# Patient Record
Sex: MALE | Race: BLACK OR AFRICAN AMERICAN | NOT HISPANIC OR LATINO | Employment: FULL TIME | URBAN - NONMETROPOLITAN AREA
[De-identification: names, ages, dates, MRNs, and addresses within clinical notes are randomized per-mention and may not be internally consistent; named-entity substitution may affect disease eponyms.]

---

## 2017-01-16 RX ORDER — NIFEDIPINE 60 MG/1
TABLET, FILM COATED, EXTENDED RELEASE ORAL
Qty: 90 TABLET | Refills: 0 | Status: SHIPPED | OUTPATIENT
Start: 2017-01-16 | End: 2017-01-26

## 2017-01-26 ENCOUNTER — OFFICE VISIT (OUTPATIENT)
Dept: FAMILY MEDICINE CLINIC | Facility: CLINIC | Age: 57
End: 2017-01-26

## 2017-01-26 VITALS
TEMPERATURE: 97.6 F | WEIGHT: 315 LBS | SYSTOLIC BLOOD PRESSURE: 148 MMHG | HEART RATE: 85 BPM | BODY MASS INDEX: 44.1 KG/M2 | OXYGEN SATURATION: 94 % | DIASTOLIC BLOOD PRESSURE: 70 MMHG | HEIGHT: 71 IN

## 2017-01-26 DIAGNOSIS — I10 ESSENTIAL HYPERTENSION: ICD-10-CM

## 2017-01-26 DIAGNOSIS — Z00.00 HEALTHCARE MAINTENANCE: ICD-10-CM

## 2017-01-26 DIAGNOSIS — M19.90 ARTHRITIS: Primary | ICD-10-CM

## 2017-01-26 DIAGNOSIS — E78.5 DYSLIPIDEMIA: ICD-10-CM

## 2017-01-26 DIAGNOSIS — E11.40 TYPE 2 DIABETES MELLITUS WITH DIABETIC NEUROPATHY, WITHOUT LONG-TERM CURRENT USE OF INSULIN (HCC): ICD-10-CM

## 2017-01-26 PROBLEM — M15.9 OSTEOARTHRITIS OF MULTIPLE JOINTS: Status: ACTIVE | Noted: 2017-01-26

## 2017-01-26 LAB
EXPIRATION DATE: ABNORMAL
HBA1C MFR BLD: 7.9 %
Lab: 671
POC CREATININE URINE: NORMAL
POC MICROALBUMIN URINE: NORMAL

## 2017-01-26 PROCEDURE — 83036 HEMOGLOBIN GLYCOSYLATED A1C: CPT | Performed by: NURSE PRACTITIONER

## 2017-01-26 PROCEDURE — 82044 UR ALBUMIN SEMIQUANTITATIVE: CPT | Performed by: NURSE PRACTITIONER

## 2017-01-26 PROCEDURE — 99214 OFFICE O/P EST MOD 30 MIN: CPT | Performed by: NURSE PRACTITIONER

## 2017-01-26 RX ORDER — LOSARTAN POTASSIUM 100 MG/1
100 TABLET ORAL DAILY
Qty: 90 TABLET | Refills: 3 | Status: SHIPPED | OUTPATIENT
Start: 2017-01-26 | End: 2020-09-23

## 2017-01-26 RX ORDER — HYDROCHLOROTHIAZIDE 12.5 MG/1
12.5 CAPSULE, GELATIN COATED ORAL DAILY
Qty: 90 CAPSULE | Refills: 3 | Status: SHIPPED | OUTPATIENT
Start: 2017-01-26 | End: 2020-09-23

## 2017-01-26 RX ORDER — HYDROCODONE BITARTRATE AND ACETAMINOPHEN 5; 325 MG/1; MG/1
1 TABLET ORAL 2 TIMES DAILY
Qty: 60 TABLET | Refills: 0 | Status: SHIPPED | OUTPATIENT
Start: 2017-01-26 | End: 2017-02-25

## 2017-01-26 RX ORDER — PNV NO.95/FERROUS FUM/FOLIC AC 28MG-0.8MG
1 TABLET ORAL DAILY
Qty: 90 EACH | Refills: 3 | Status: SHIPPED | OUTPATIENT
Start: 2017-01-26 | End: 2017-04-26

## 2017-01-26 RX ORDER — PYRITHIONE ZINC 1 G/ML
1 SHAMPOO TOPICAL DAILY
Qty: 90 EACH | Refills: 3 | Status: SHIPPED | OUTPATIENT
Start: 2017-01-26

## 2017-01-26 RX ORDER — NIFEDIPINE 60 MG/1
60 TABLET, EXTENDED RELEASE ORAL DAILY
Qty: 90 TABLET | Refills: 3 | Status: SHIPPED | OUTPATIENT
Start: 2017-01-26 | End: 2018-01-22 | Stop reason: SDUPTHER

## 2017-01-26 RX ORDER — DOCUSATE SODIUM 100 MG/1
100 CAPSULE, LIQUID FILLED ORAL DAILY
Qty: 90 CAPSULE | Refills: 3 | Status: SHIPPED | OUTPATIENT
Start: 2017-01-26 | End: 2017-06-27

## 2017-01-26 RX ORDER — LOSARTAN POTASSIUM 100 MG/1
TABLET ORAL
COMMUNITY
Start: 2016-11-23 | End: 2017-01-26 | Stop reason: SDUPTHER

## 2017-01-26 NOTE — MR AVS SNAPSHOT
Pastor Jeter   1/26/2017 9:30 AM   Office Visit    Dept Phone:  [phone]   Encounter #:  [id number]    Provider:  OVIDIO Puckett   Department:  [facility]                Your Full Care Plan              Today's Medication Changes          These changes are accurate as of: 1/26/17 10:24 AM.  If you have any questions, ask your nurse or doctor.               New Medication(s)Ordered:     HYDROcodone-acetaminophen 5-325 MG per tablet   Commonly known as:  NORCO   Take 1 tablet by mouth 2 (Two) Times a Day for 30 days.   Started by:  OVIDIO Puckett         Medication(s)that have changed:     Canagliflozin 300 MG tablet   Commonly known as:  INVOKANA   Take 300 mg by mouth Daily for 90 days.   What changed:    - medication strength  - how much to take   Changed by:  OVIDIO Puckett       docusate sodium 100 MG capsule   Commonly known as:  COLACE   Take 1 capsule by mouth Daily.   What changed:    - how much to take  - when to take this   Changed by:  OVIDIO Puckett       EQL FISH OIL 1000 MG capsule   Take 1 tablet by mouth Daily.   What changed:  how much to take   Changed by:  OVIDIO Puckett       Iron 325 (65 FE) MG tablet   Take 1 tablet by mouth Daily for 90 days.   What changed:  how much to take   Changed by:  OVIDIO Puckett       losartan 100 MG tablet   Commonly known as:  COZAAR   Take 1 tablet by mouth Daily for 90 days.   What changed:    - how much to take  - how to take this  - when to take this   Changed by:  OVIDIO Puckett       NIFEdipine XL 60 MG 24 hr tablet   Commonly known as:  NIFEDICAL XL   Take 1 tablet by mouth Daily.   What changed:  See the new instructions.   Changed by:  OVIDIO Puckett         Stop taking medication(s)listed here:     Diclofenac 35 MG capsule      Stopped by:  OVIDIO Puckett           glucosamine-chondroitin 500-400 MG capsule capsule   Stopped by:  OVIDIO Puckett           losartan-hydrochlorothiazide 100-25 MG per tablet   Commonly known as:  HYZAAR   Stopped by:  OVIDIO Puckett                Where to Get Your Medications      These medications were sent to [facility] - [place], [place] - [address] [facility] - [phone]  - [phone] [address], [facility] [address]     Phone:  [phone]     Canagliflozin 300 MG tablet    docusate sodium 100 MG capsule    EQL FISH OIL 1000 MG capsule    hydrochlorothiazide 12.5 MG capsule    Iron 325 (65 FE) MG tablet    losartan 100 MG tablet    metFORMIN 1000 MG tablet    NIFEdipine XL 60 MG 24 hr tablet         You can get these medications from any pharmacy     Bring a paper prescription for each of these medications     HYDROcodone-acetaminophen 5-325 MG per tablet                  Your Updated Medication List          This list is accurate as of: 1/26/17 10:24 AM.  Always use your most recent med list.                Canagliflozin 300 MG tablet   Commonly known as:  INVOKANA   Take 300 mg by mouth Daily for 90 days.       docusate sodium 100 MG capsule   Commonly known as:  COLACE   Take 1 capsule by mouth Daily.       EQL FISH OIL 1000 MG capsule   Take 1 tablet by mouth Daily.       hydrochlorothiazide 12.5 MG capsule   Commonly known as:  MICROZIDE   Take 1 capsule by mouth Daily for 90 days.       HYDROcodone-acetaminophen 5-325 MG per tablet   Commonly known as:  NORCO   Take 1 tablet by mouth 2 (Two) Times a Day for 30 days.       Iron 325 (65 FE) MG tablet   Take 1 tablet by mouth Daily for 90 days.       losartan 100 MG tablet   Commonly known as:  COZAAR   Take 1 tablet by mouth Daily for 90 days.       meloxicam 15 MG tablet   Commonly known as:  MOBIC       metFORMIN 1000 MG tablet   Commonly known  as:  GLUCOPHAGE   Take 1 tablet by mouth 2 (Two) Times a Day With Meals for 90 days.       Multiple Vitamin Essential tablet       NIFEdipine XL 60 MG 24 hr tablet   Commonly known as:  NIFEDICAL XL   Take 1 tablet by mouth Daily.       OSTEO BI-FLEX JOINT SHIELD PO       vitamin D 98884 UNITS capsule capsule   Commonly known as:  ERGOCALCIFEROL   Take 1 capsule by mouth Every 7 (Seven) Days.               We Performed the Following     Ambulatory Referral to Orthopedic Surgery     CBC & Differential     Compliance Drug Analysis, Ur     Comprehensive Metabolic Panel     Hepatitis C Antibody     Lipid Panel     POC Glycated Hemoglobin, Total     POC Microalbumin       You Were Diagnosed With        Codes Comments    Arthritis    -  Primary ICD-10-CM: M19.90  ICD-9-CM: 716.90     Type 2 diabetes mellitus with diabetic neuropathy, without long-term current use of insulin     ICD-10-CM: E11.40  ICD-9-CM: 250.60, 357.2     Essential hypertension     ICD-10-CM: I10  ICD-9-CM: 401.9     Dyslipidemia     ICD-10-CM: E78.5  ICD-9-CM: 272.4     Healthcare maintenance     ICD-10-CM: Z00.00  ICD-9-CM: V70.0       Instructions     None    Patient Instructions History      Upcoming Appointments     Visit Type Date Time Department    ACUTE           2017  9:30 AM BOOGIE QUINTEROS      [employer] Signup     [facility] allows you to send messages to your doctor, view your test results, renew your prescriptions, schedule appointments, and more. To sign up, go to [employer] and click on the Sign Up Now link in the New User? box. Enter your [employer] Activation Code exactly as it appears below along with the last four digits of your Social Security Number and your Date of Birth () to complete the sign-up process. If you do not sign up before the expiration date, you must request a new code.    Spot[employer] Activation Code: [email or web address]  Expires: 2017 10:24 AM    If you have questions, you can email  [email or web address] or call [phone] to talk to our MyChart staff. Remember, MyChart is NOT to be used for urgent needs. For medical emergencies, dial 911.               Other Info from Your Visit           Allergies     Jardiance [Empagliflozin]  GI Intolerance      Reason for Visit     Knee Pain Left Knee      Vital Signs     Blood Pressure Pulse Temperature Height Weight Oxygen Saturation    148/70 (BP Location: Left arm) 85 97.6 °F (36.4 °C) (Oral) 71\" (180.3 cm) 320 lb (145 kg) 94%    Body Mass Index Smoking Status                44.63 kg/m2 Former Smoker          Problems and Diagnoses Noted     Arthritis    High blood pressure    Diabetes    Dyslipidemia    Osteoarthritis (arthritis due to wear and tear of joints)    Routine medical exam            "

## 2017-01-26 NOTE — PROGRESS NOTES
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 Patient is here today for left knee pain. He is not sure what he did to it. His knee has been hurting 2-3 weeks and 4 days ago it got worse and now cannot walk on it.     History of Present Illness 56-year-old -American male presents to the office today with severe knee pain especially worse in his left knee pain is grinding stabbing unbearable. His knee has been hurting exceptionally bad for 2-3 weeks and 4 days ago got to the point where he could no longer walk. Using cane in right hand only able to ambulate a few feet at a time. Has not been able to work for the last week. Patient has been fighting arthritis for the last several years. He is rubbing bone-on-bone and needs a knee replacement. Will refer to orthopedic surgery to see if Synvisc injections might prolong a surgical date. Patient also due for routine lab work including A1c and microalbumin. Patient cut his metformin dose in half because he felt the metformin made him feel tired. Stressed the importance of taking the whole dose of metformin to keep A1c down.     The following portions of the patient's history were reviewed and updated as appropriate: allergies, current medications, past family history, past medical history, past social history, past surgical history and problem list.    Review of Systems   Musculoskeletal: Positive for gait problem and joint swelling.        Knee Pain.   All other systems reviewed and are negative.      Objective   Physical Exam   Constitutional: He is oriented to person, place, and time. He appears well-developed and well-nourished.   HENT:   Head: Normocephalic and atraumatic.   Eyes: EOM are normal. Pupils are equal, round, and reactive to light.   Neck: Normal range of motion. Neck supple.   Cardiovascular: Normal rate and regular rhythm.    Pulmonary/Chest: Effort normal and breath sounds normal.   Abdominal: Soft. Bowel sounds are normal.   obese Abd    Musculoskeletal: He exhibits edema, tenderness and deformity.   Arthritic changes throughout body but worse and knees especially the left     Neurological: He is alert and oriented to person, place, and time.   Skin: Skin is warm and dry.   Psychiatric: He has a normal mood and affect. His behavior is normal. Judgment and thought content normal.   Nursing note and vitals reviewed.      Assessment/Plan   Pastor was seen today for knee pain.    Diagnoses and all orders for this visit:    Arthritis  -     Ambulatory Referral to Orthopedic Surgery  -     CBC & Differential  -     Comprehensive Metabolic Panel  -     Lipid Panel  -     POC Glycated Hemoglobin, Total  -     POC Microalbumin  -     Compliance Drug Analysis, Ur; Future  -     HYDROcodone-acetaminophen (NORCO) 5-325 MG per tablet; Take 1 tablet by mouth 2 (Two) Times a Day for 30 days.    Type 2 diabetes mellitus with diabetic neuropathy, without long-term current use of insulin  -     Ambulatory Referral to Orthopedic Surgery  -     CBC & Differential  -     Comprehensive Metabolic Panel  -     Lipid Panel  -     POC Glycated Hemoglobin, Total  -     POC Microalbumin    Essential hypertension    Dyslipidemia    Other orders  -     NIFEdipine XL (NIFEDICAL XL) 60 MG 24 hr tablet; Take 1 tablet by mouth Daily.  -     hydrochlorothiazide (MICROZIDE) 12.5 MG capsule; Take 1 capsule by mouth Daily for 90 days.  -     Canagliflozin (INVOKANA) 300 MG tablet; Take 300 mg by mouth Daily for 90 days.  -     docusate sodium (COLACE) 100 MG capsule; Take 1 capsule by mouth Daily.  -     Ferrous Sulfate (IRON) 325 (65 FE) MG tablet; Take 1 tablet by mouth Daily for 90 days.  -     losartan (COZAAR) 100 MG tablet; Take 1 tablet by mouth Daily for 90 days.  -     metFORMIN (GLUCOPHAGE) 1000 MG tablet; Take 1 tablet by mouth 2 (Two) Times a Day With Meals for 90 days.  -     Omega-3 Fatty Acids (EQL FISH OIL) 1000 MG capsule; Take 1 tablet by mouth Daily.    To F/U every  6 months and PRN. To eat a low-fat high-fiber diet full of fruits and vegetables, drinking 6-8 glasses of water a day, and exercising 30 minutes a day as able. To follow-up with orthopedic surgery for treatment options. Patient does not want surgery unless absolutely necessary. Notify office immediately of any decline in health status. Patient has been taking 1 metformin tablet a day because to metformin tablets a day make him fatigued. Asked to check his blood sugar periodically record and report on next visit. Compliant screen requested, Kaspar requested, and medication agreement signed. In office A1c is 7.9 and in office microalbumin is normal. Patient taking only half his dose of metformin as reason for elevated A1c. Stressed the importance to the patient to take proper dose of metformin

## 2017-01-27 LAB
ALBUMIN SERPL-MCNC: 4.5 G/DL (ref 3.5–5.2)
ALBUMIN/GLOB SERPL: 1.3 G/DL
ALP SERPL-CCNC: 97 U/L (ref 39–117)
ALT SERPL-CCNC: 41 U/L (ref 1–41)
AST SERPL-CCNC: 23 U/L (ref 1–40)
BASOPHILS # BLD AUTO: 0.03 10*3/MM3 (ref 0–0.2)
BASOPHILS NFR BLD AUTO: 0.5 % (ref 0–1.5)
BILIRUB SERPL-MCNC: 0.4 MG/DL (ref 0.1–1.2)
BUN SERPL-MCNC: 18 MG/DL (ref 6–20)
BUN/CREAT SERPL: 18.4 (ref 7–25)
CALCIUM SERPL-MCNC: 10.5 MG/DL (ref 8.6–10.5)
CHLORIDE SERPL-SCNC: 101 MMOL/L (ref 98–107)
CHOLEST SERPL-MCNC: 230 MG/DL (ref 0–200)
CO2 SERPL-SCNC: 25.7 MMOL/L (ref 22–29)
CREAT SERPL-MCNC: 0.98 MG/DL (ref 0.76–1.27)
EOSINOPHIL # BLD AUTO: 0.12 10*3/MM3 (ref 0–0.7)
EOSINOPHIL NFR BLD AUTO: 1.9 % (ref 0.3–6.2)
ERYTHROCYTE [DISTWIDTH] IN BLOOD BY AUTOMATED COUNT: 13.5 % (ref 11.5–14.5)
GLOBULIN SER CALC-MCNC: 3.4 GM/DL
GLUCOSE SERPL-MCNC: 162 MG/DL (ref 65–99)
HCT VFR BLD AUTO: 48 % (ref 40.4–52.2)
HCV AB S/CO SERPL IA: 0.1 S/CO RATIO (ref 0–0.9)
HDLC SERPL-MCNC: 60 MG/DL (ref 40–60)
HGB BLD-MCNC: 14.8 G/DL (ref 13.7–17.6)
IMM GRANULOCYTES # BLD: 0 10*3/MM3 (ref 0–0.03)
IMM GRANULOCYTES NFR BLD: 0 % (ref 0–0.5)
LDLC SERPL CALC-MCNC: 142 MG/DL (ref 0–100)
LYMPHOCYTES # BLD AUTO: 1.88 10*3/MM3 (ref 0.9–4.8)
LYMPHOCYTES NFR BLD AUTO: 29.9 % (ref 19.6–45.3)
MCH RBC QN AUTO: 31.4 PG (ref 27–32.7)
MCHC RBC AUTO-ENTMCNC: 30.8 G/DL (ref 32.6–36.4)
MCV RBC AUTO: 101.7 FL (ref 79.8–96.2)
MONOCYTES # BLD AUTO: 0.63 10*3/MM3 (ref 0.2–1.2)
MONOCYTES NFR BLD AUTO: 10 % (ref 5–12)
NEUTROPHILS # BLD AUTO: 3.63 10*3/MM3 (ref 1.9–8.1)
NEUTROPHILS NFR BLD AUTO: 57.7 % (ref 42.7–76)
PLATELET # BLD AUTO: 264 10*3/MM3 (ref 140–500)
POTASSIUM SERPL-SCNC: 4.6 MMOL/L (ref 3.5–5.2)
PROT SERPL-MCNC: 7.9 G/DL (ref 6–8.5)
RBC # BLD AUTO: 4.72 10*6/MM3 (ref 4.6–6)
SODIUM SERPL-SCNC: 145 MMOL/L (ref 136–145)
TRIGL SERPL-MCNC: 138 MG/DL (ref 0–150)
VLDLC SERPL CALC-MCNC: 27.6 MG/DL (ref 5–40)
WBC # BLD AUTO: 6.29 10*3/MM3 (ref 4.5–10.7)

## 2017-01-31 LAB
DRUGS UR: NORMAL
Lab: NORMAL

## 2017-02-06 ENCOUNTER — DOCUMENTATION (OUTPATIENT)
Dept: FAMILY MEDICINE CLINIC | Facility: CLINIC | Age: 57
End: 2017-02-06

## 2017-02-06 ENCOUNTER — TELEPHONE (OUTPATIENT)
Dept: FAMILY MEDICINE CLINIC | Facility: CLINIC | Age: 57
End: 2017-02-06

## 2017-02-06 NOTE — PROGRESS NOTES
Patient called to notify me that he has accidentally fallen on his knees causing a great deal of pain and that he is unable to walk at this point. We will be scheduling appointment as soon as he is able to drive himself here.

## 2017-02-06 NOTE — TELEPHONE ENCOUNTER
Patient called this morning asking for you. He said you all had talked the other day and it was in regards to that and he needs you to call him back. He would like for you to call him at [phone].

## 2017-02-20 ENCOUNTER — TELEPHONE (OUTPATIENT)
Dept: FAMILY MEDICINE CLINIC | Facility: CLINIC | Age: 57
End: 2017-02-20

## 2017-02-20 NOTE — TELEPHONE ENCOUNTER
Patient is being seen by orthopedic surgery. I wrote a brief letter explaining the fell at home hurt his knees and missed work. Thank you

## 2017-02-20 NOTE — TELEPHONE ENCOUNTER
----- Message from Misha Florence sent at 2/20/2017  8:09 AM EST -----  Regarding: REQUESTING CALL   Contact: [phone]  ANDRES PATIENT IS REQUESTING A CALL IN REGARDS TO INJURY TO HIS KNEE. PATIENT STATED HE HAS TALKED YOU IN REGARDS TO HIS [employer] INSURANCE NOT WANTING TO PAY. PLEASE CALL [phone]. I ASKED PATIENT IF HE NEEDED TO SCHEDULE AN APPOINTMENT, HE DECLINED.    THANKS!

## 2017-02-23 ENCOUNTER — OFFICE VISIT (OUTPATIENT)
Dept: ORTHOPEDIC SURGERY | Facility: CLINIC | Age: 57
End: 2017-02-23

## 2017-02-23 VITALS — BODY MASS INDEX: 42.66 KG/M2 | HEIGHT: 72 IN | WEIGHT: 315 LBS

## 2017-02-23 DIAGNOSIS — G89.29 CHRONIC PAIN OF BOTH KNEES: Primary | ICD-10-CM

## 2017-02-23 DIAGNOSIS — M25.562 CHRONIC PAIN OF BOTH KNEES: Primary | ICD-10-CM

## 2017-02-23 DIAGNOSIS — M19.90 ARTHRITIS: ICD-10-CM

## 2017-02-23 DIAGNOSIS — M25.561 CHRONIC PAIN OF BOTH KNEES: Primary | ICD-10-CM

## 2017-02-23 PROCEDURE — 99204 OFFICE O/P NEW MOD 45 MIN: CPT | Performed by: ORTHOPAEDIC SURGERY

## 2017-02-23 PROCEDURE — 73562 X-RAY EXAM OF KNEE 3: CPT | Performed by: ORTHOPAEDIC SURGERY

## 2017-02-23 PROCEDURE — 20610 DRAIN/INJ JOINT/BURSA W/O US: CPT | Performed by: ORTHOPAEDIC SURGERY

## 2017-02-23 RX ORDER — METHYLPREDNISOLONE ACETATE 80 MG/ML
80 INJECTION, SUSPENSION INTRA-ARTICULAR; INTRALESIONAL; INTRAMUSCULAR; SOFT TISSUE
Status: COMPLETED | OUTPATIENT
Start: 2017-02-23 | End: 2017-02-23

## 2017-02-23 RX ORDER — BUPIVACAINE HYDROCHLORIDE 5 MG/ML
4 INJECTION, SOLUTION EPIDURAL; INTRACAUDAL
Status: COMPLETED | OUTPATIENT
Start: 2017-02-23 | End: 2017-02-23

## 2017-02-23 RX ADMIN — METHYLPREDNISOLONE ACETATE 80 MG: 80 INJECTION, SUSPENSION INTRA-ARTICULAR; INTRALESIONAL; INTRAMUSCULAR; SOFT TISSUE at 08:56

## 2017-02-23 RX ADMIN — BUPIVACAINE HYDROCHLORIDE 4 ML: 5 INJECTION, SOLUTION EPIDURAL; INTRACAUDAL at 08:56

## 2017-02-23 NOTE — PROGRESS NOTES
New bilateral Knee      Patient: Pastor Jeter        YOB: 1960    Medical Record Number: [id number]        Chief Complaints: Bilateral Knee Pain       History of Present Illness: This is a  56 y.o.  who presents complaining of bilateral knee pain left greater than right.  He states his been ongoing for quite some time it worsened around January 24.  He has medial knee pain and posterior knee pain pain with activity it is moderate constant aching with clicking popping snapping and bruising.  His past medical history marked for obesity and diabetes.    Allergies:   Allergies   Allergen Reactions   • Jardiance [Empagliflozin] GI Intolerance       Medications:   Home Medications:  Current Outpatient Prescriptions on File Prior to Visit   Medication Sig   • Canagliflozin (INVOKANA) 300 MG tablet Take 300 mg by mouth Daily for 90 days.   • Ferrous Sulfate (IRON) 325 (65 FE) MG tablet Take 1 tablet by mouth Daily for 90 days.   • hydrochlorothiazide (MICROZIDE) 12.5 MG capsule Take 1 capsule by mouth Daily for 90 days.   • losartan (COZAAR) 100 MG tablet Take 1 tablet by mouth Daily for 90 days.   • meloxicam (MOBIC) 15 MG tablet Take 15 mg by mouth daily.   • metFORMIN (GLUCOPHAGE) 1000 MG tablet Take 1 tablet by mouth 2 (Two) Times a Day With Meals for 90 days.   • Misc Natural Products (OSTEO BI-FLEX JOINT SHIELD PO) Take  by mouth.   • Multiple Vitamin Essential tablet Take  by mouth daily.   • NIFEdipine XL (NIFEDICAL XL) 60 MG 24 hr tablet Take 1 tablet by mouth Daily.   • Omega-3 Fatty Acids (EQL FISH OIL) 1000 MG capsule Take 1 tablet by mouth Daily.   • vitamin D (ERGOCALCIFEROL) 53338 UNITS capsule capsule Take 1 capsule by mouth Every 7 (Seven) Days.   • docusate sodium (COLACE) 100 MG capsule Take 1 capsule by mouth Daily.   • HYDROcodone-acetaminophen (NORCO) 5-325 MG per tablet Take 1 tablet by mouth 2 (Two) Times a Day for 30 days.     No current facility-administered medications  "on file prior to visit.      Current Medications:  Scheduled Meds:  Continuous Infusions:  No current facility-administered medications for this visit.   PRN Meds:.    History reviewed. No pertinent past medical history.     Past Surgical History   Procedure Laterality Date   • Appendectomy          Social History     Occupational History   • Not on file.     Social History Main Topics   • Smoking status: Former Smoker   • Smokeless tobacco: Never Used   • Alcohol use Yes      Comment: occassional   • Drug use: No   • Sexual activity: Not on file    Social History     Social History Narrative        Family History   Problem Relation Age of Onset   • Diabetes Mother    • Hypertension Mother    • Hypertension Brother    • Hypertension Brother              Review of Systems: His 14 point review of systems are remarkable for the pertinent positives listed in chart by the patient the remainder are negative    Review of Systems      Physical Exam: 56 y.o. male  General Appearance:    Alert, cooperative, in no acute distress                 Vitals:    02/23/17 0817   Weight: (!) 358 lb (162 kg)   Height: 71.5\" (181.6 cm)      Patient is alert and read ×3 no acute distress appears her above-listed at height weight and age.  Affect is normal respiratory rate is normal unlabored. Heart rate regular rate rhythm, sclera, dentition and hearing are normal for the purpose of this exam.        Ortho Exam Physical exam of the left knee reveals no effusion, no erythema.  It mild loss of extension and full flexion  Patient has mild varus alignment.  They have mild tenderness to palpation about the medial compartment, no tenderness laterally..  The patient has a negative bounce home, negative Hailey and a stable ligamentous exam.  Quad tone is reasonable and symmetric.  There are no overlying skin changes no lymphedema no lymphadenopathy.  There is good hip range of motion which is full symmetric and asymptomatic and a normal ankle " exam.    Physical exam of the right knee reveals no effusion, no erythema.  It mild loss of extension and full flexion  Patient has mild varus alignment.  They have mild tenderness to palpation about the medial compartment, no tenderness laterally..  The patient has a negative bounce home, negative Hailey and a stable ligamentous exam.  Quad tone is reasonable and symmetric.  There are no overlying skin changes no lymphedema no lymphadenopathy.  There is good hip range of motion which is full symmetric and asymptomatic and a normal ankle exam.      Large Joint Arthrocentesis  Date/Time: 2/23/2017 8:56 AM  Consent given by: patient  Site marked: site marked  Timeout: Immediately prior to procedure a time out was called to verify the correct patient, procedure, equipment, support staff and site/side marked as required   Supporting Documentation  Indications: pain   Procedure Details  Location: knee - R knee  Needle size: 25 G  Approach: anteromedial  Medications administered: 80 mg methylPREDNISolone acetate 80 MG/ML; 4 mL bupivacaine (PF) 0.5 %      Large Joint Arthrocentesis  Date/Time: 2/23/2017 8:56 AM  Consent given by: patient  Site marked: site marked  Timeout: Immediately prior to procedure a time out was called to verify the correct patient, procedure, equipment, support staff and site/side marked as required   Supporting Documentation  Indications: pain   Procedure Details  Location: knee - L knee  Needle size: 25 G  Approach: anteromedial  Medications administered: 4 mL bupivacaine (PF) 0.5 %; 80 mg methylPREDNISolone acetate 80 MG/ML                     Radiology:   AP, Lateral and merchant views of the right and left I've no comparison films these reveal severe tricompartmental OA most pronounced on the medial aspect.  On the left he has a large ossicle anteriorly near the tibial tubercle.  He has sclerosis and bone spurs present as well.  knee  were ordered/reviewed to evauateknee pain.        Assessment/Plan:        Bilateral severe knee pain and arthritis I discussed with him the options available to him.  Think injections are quite reasonable he would like to try this.  I think it's imperative that he get his weight down.  I told him his weight is 2 hi this point to proceed with knee replacement he understands this and we'll get working on that.  I will have him see Dr. Wan for further discussion we will have him do some physical therapy and strengthening as well

## 2017-03-28 ENCOUNTER — CONSULT (OUTPATIENT)
Dept: ORTHOPEDIC SURGERY | Facility: CLINIC | Age: 57
End: 2017-03-28

## 2017-03-28 VITALS — WEIGHT: 315 LBS | HEIGHT: 72 IN | TEMPERATURE: 97.6 F | BODY MASS INDEX: 42.66 KG/M2

## 2017-03-28 DIAGNOSIS — M17.0 PRIMARY OSTEOARTHRITIS OF BOTH KNEES: Primary | ICD-10-CM

## 2017-03-28 PROCEDURE — 99204 OFFICE O/P NEW MOD 45 MIN: CPT | Performed by: ORTHOPAEDIC SURGERY

## 2017-03-28 NOTE — PROGRESS NOTES
Patient: Pastor Jeter  YOB: 1960 56 y.o. 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 Has done better over the last few weeks after cortisone injections. He is a diabetic, works as a .    Allergies:   Allergies   Allergen Reactions   • Jardiance [Empagliflozin] GI Intolerance       Medications:   Current Outpatient Prescriptions   Medication Sig Dispense Refill   • Canagliflozin (INVOKANA) 300 MG tablet Take 300 mg by mouth Daily for 90 days. 90 tablet 3   • docusate sodium (COLACE) 100 MG capsule Take 1 capsule by mouth Daily. 90 capsule 3   • Ferrous Sulfate (IRON) 325 (65 FE) MG tablet Take 1 tablet by mouth Daily for 90 days. 90 each 3   • hydrochlorothiazide (MICROZIDE) 12.5 MG capsule Take 1 capsule by mouth Daily for 90 days. 90 capsule 3   • losartan (COZAAR) 100 MG tablet Take 1 tablet by mouth Daily for 90 days. 90 tablet 3   • meloxicam (MOBIC) 15 MG tablet Take 15 mg by mouth daily.     • metFORMIN (GLUCOPHAGE) 1000 MG tablet Take 1 tablet by mouth 2 (Two) Times a Day With Meals for 90 days. 180 tablet 2   • Misc Natural Products (OSTEO BI-FLEX JOINT SHIELD PO) Take  by mouth.     • Multiple Vitamin Essential tablet Take  by mouth daily.     • NIFEdipine XL (NIFEDICAL XL) 60 MG 24 hr tablet Take 1 tablet by mouth Daily. 90 tablet 3   • Omega-3 Fatty Acids (EQL FISH OIL) 1000 MG capsule Take 1 tablet by mouth Daily. 90 each 3   • vitamin D (ERGOCALCIFEROL) 74444 UNITS capsule capsule Take 1 capsule by mouth Every 7 (Seven) Days. 20 capsule 1     No current facility-administered medications for this visit.          The following portions of the patient's history were reviewed and updated as  "appropriate: allergies, current medications, past family history, past medical history, past social history, past surgical history and problem list.    Review of Systems:   A 14 point review of systems was performed. All systems negative except pertinent positives/negative listed in HPI above    Physical Exam:   Vitals:    03/28/17 1308   Temp: 97.6 °F (36.4 °C)   Weight: (!) 350 lb (159 kg)   Height: 72\" (182.9 cm)       General: A and O x 3, ASA, NAD    SCLERA:    Normal    DENTITION:   Normal  Knee:  bilateral    ALIGNMENT:     Varus  ,   Patella  tracks  midline    GAIT:    Antalgic    SKIN:    No abnormality    RANGE OF MOTION:   5  -  120   DEG    STRENGTH:   4  / 5    LIGAMENTS:    No varus / valgus instability.   Negative  Lachman.    MENISCUS:     Negative   Hailey       DISTAL PULSES:    Paplable    DISTAL SENSATION :   Intact    LYMPHATICS:     No   lymphadenopathy    OTHER:          - Positive   effusion      - Crepitance with ROM          Radiology:  Xrays 3views both knees (ap,lateral, sunrise) recently taken at outside institution were reviewed demonstrating advanced varus osteoarthritis with bone on bone articulation, subchondral cysts, and periarticular osteophytes    Assessment/Plan: Bilateral knee advanced end-stage osteoarthritis.  He is clearly heading where he will need knee replacements.  I explained to him the advanced nature of his arthritis and the progressive nature meaning it will get worse over time.  He currently has an elevated BMI and needs to get to 300 pounds to have a BMI of 40 or below which would qualify him for surgery.  I have explained to him the urgency that he needs to have with regard to weight loss.  I've recommended that he get involved in a program such as Weight Watchers or [employer]ystem to assist him with this.  He may require further cortisone injections before the time of surgery.  I will plan on checking him back in 3 months and we will re-wait him at that time.  If " he needs a cortisone injection due to the increased pain in the interim he will call us.  Additionally he is going to work on therapy exercises and has a home exercise bike that he is going to wean into trying to get to 30 minutes 3 times a week.      Harpal Wan MD  3/28/2017

## 2017-05-23 ENCOUNTER — OFFICE VISIT (OUTPATIENT)
Dept: FAMILY MEDICINE CLINIC | Facility: CLINIC | Age: 57
End: 2017-05-23

## 2017-05-23 VITALS
OXYGEN SATURATION: 92 % | WEIGHT: 315 LBS | SYSTOLIC BLOOD PRESSURE: 122 MMHG | DIASTOLIC BLOOD PRESSURE: 68 MMHG | BODY MASS INDEX: 42.66 KG/M2 | HEART RATE: 81 BPM | HEIGHT: 72 IN | TEMPERATURE: 98.3 F

## 2017-05-23 DIAGNOSIS — E11.49 TYPE 2 DIABETES MELLITUS WITH OTHER NEUROLOGIC COMPLICATION, WITHOUT LONG-TERM CURRENT USE OF INSULIN (HCC): ICD-10-CM

## 2017-05-23 DIAGNOSIS — Z00.00 HEALTHCARE MAINTENANCE: ICD-10-CM

## 2017-05-23 DIAGNOSIS — Z23 NEED FOR PNEUMOCOCCAL VACCINE: ICD-10-CM

## 2017-05-23 DIAGNOSIS — M15.9 PRIMARY OSTEOARTHRITIS INVOLVING MULTIPLE JOINTS: Primary | ICD-10-CM

## 2017-05-23 PROCEDURE — 99214 OFFICE O/P EST MOD 30 MIN: CPT | Performed by: NURSE PRACTITIONER

## 2017-05-23 PROCEDURE — 90732 PPSV23 VACC 2 YRS+ SUBQ/IM: CPT | Performed by: NURSE PRACTITIONER

## 2017-05-23 PROCEDURE — 90471 IMMUNIZATION ADMIN: CPT | Performed by: NURSE PRACTITIONER

## 2017-05-23 RX ORDER — HYDROCHLOROTHIAZIDE 12.5 MG/1
12.5 CAPSULE, GELATIN COATED ORAL DAILY
COMMUNITY
End: 2018-01-27 | Stop reason: SDUPTHER

## 2017-05-23 RX ORDER — SIMVASTATIN 5 MG
5 TABLET ORAL NIGHTLY
Qty: 90 TABLET | Refills: 3 | Status: SHIPPED | OUTPATIENT
Start: 2017-05-23 | End: 2017-06-27

## 2017-05-23 RX ORDER — HYDROCODONE BITARTRATE AND ACETAMINOPHEN 5; 325 MG/1; MG/1
1 TABLET ORAL 2 TIMES DAILY PRN
Qty: 60 TABLET | Refills: 0 | Status: SHIPPED | OUTPATIENT
Start: 2017-05-23 | End: 2018-01-30 | Stop reason: SDUPTHER

## 2017-05-23 RX ORDER — LOSARTAN POTASSIUM 100 MG/1
100 TABLET ORAL DAILY
COMMUNITY
End: 2018-01-27 | Stop reason: SDUPTHER

## 2017-06-13 ENCOUNTER — TELEPHONE (OUTPATIENT)
Dept: FAMILY MEDICINE CLINIC | Facility: CLINIC | Age: 57
End: 2017-06-13

## 2017-06-13 RX ORDER — MELOXICAM 15 MG/1
15 TABLET ORAL DAILY
Qty: 90 TABLET | Refills: 3 | Status: SHIPPED | OUTPATIENT
Start: 2017-06-13 | End: 2017-06-15 | Stop reason: SDUPTHER

## 2017-06-13 NOTE — TELEPHONE ENCOUNTER
Please let patient know that I ordered his meloxicam for a 90 day supply sending it to [facility] as requested

## 2017-06-13 NOTE — TELEPHONE ENCOUNTER
Patient called states he has the information regarding medication for diabetes that you spoke about and wanted to speak to you only, please call [phone]

## 2017-06-14 NOTE — TELEPHONE ENCOUNTER
Patient states he called [facility] and they will not send it for 1 to 2 weeks because they told him it isn't time to be refilled yet, requesting a 30 days supply sent to [facility] and he will pay cash

## 2017-06-15 ENCOUNTER — TELEPHONE (OUTPATIENT)
Dept: FAMILY MEDICINE CLINIC | Facility: CLINIC | Age: 57
End: 2017-06-15

## 2017-06-15 RX ORDER — MELOXICAM 15 MG/1
15 TABLET ORAL DAILY
Qty: 30 TABLET | Refills: 3 | Status: SHIPPED | OUTPATIENT
Start: 2017-06-15 | End: 2018-01-30 | Stop reason: SDUPTHER

## 2017-06-15 RX ORDER — MELOXICAM 15 MG/1
15 TABLET ORAL DAILY
Qty: 30 TABLET | Refills: 3 | Status: SHIPPED | OUTPATIENT
Start: 2017-06-15 | End: 2017-06-15 | Stop reason: SDUPTHER

## 2017-06-15 NOTE — TELEPHONE ENCOUNTER
Pt came in and he is needing a 30 day suppy of his Meloxicam 15 mg called to [facility] he knows that [facility] called in a 90 day supply to his mail order but he is out because he was shorted at his last refill from the mailorder. His best call back is [phone]

## 2017-06-27 ENCOUNTER — RESULTS ENCOUNTER (OUTPATIENT)
Dept: FAMILY MEDICINE CLINIC | Facility: CLINIC | Age: 57
End: 2017-06-27

## 2017-06-27 ENCOUNTER — OFFICE VISIT (OUTPATIENT)
Dept: ORTHOPEDIC SURGERY | Facility: CLINIC | Age: 57
End: 2017-06-27

## 2017-06-27 VITALS — TEMPERATURE: 98.2 F | HEIGHT: 71 IN | WEIGHT: 315 LBS | BODY MASS INDEX: 44.1 KG/M2

## 2017-06-27 DIAGNOSIS — Z23 NEED FOR PNEUMOCOCCAL VACCINE: ICD-10-CM

## 2017-06-27 DIAGNOSIS — E11.49 TYPE 2 DIABETES MELLITUS WITH OTHER NEUROLOGIC COMPLICATION, WITHOUT LONG-TERM CURRENT USE OF INSULIN (HCC): ICD-10-CM

## 2017-06-27 DIAGNOSIS — M15.9 PRIMARY OSTEOARTHRITIS INVOLVING MULTIPLE JOINTS: ICD-10-CM

## 2017-06-27 DIAGNOSIS — Z00.00 HEALTHCARE MAINTENANCE: ICD-10-CM

## 2017-06-27 DIAGNOSIS — M17.0 PRIMARY OSTEOARTHRITIS OF BOTH KNEES: Primary | ICD-10-CM

## 2017-06-27 PROCEDURE — 20610 DRAIN/INJ JOINT/BURSA W/O US: CPT | Performed by: ORTHOPAEDIC SURGERY

## 2017-06-27 RX ORDER — METHYLPREDNISOLONE ACETATE 80 MG/ML
80 INJECTION, SUSPENSION INTRA-ARTICULAR; INTRALESIONAL; INTRAMUSCULAR; SOFT TISSUE
Status: COMPLETED | OUTPATIENT
Start: 2017-06-27 | End: 2017-06-27

## 2017-06-27 RX ORDER — LIDOCAINE HYDROCHLORIDE 10 MG/ML
2 INJECTION, SOLUTION INFILTRATION; PERINEURAL
Status: COMPLETED | OUTPATIENT
Start: 2017-06-27 | End: 2017-06-27

## 2017-06-27 RX ORDER — BUPIVACAINE HYDROCHLORIDE 5 MG/ML
2 INJECTION, SOLUTION PERINEURAL
Status: COMPLETED | OUTPATIENT
Start: 2017-06-27 | End: 2017-06-27

## 2017-06-27 RX ADMIN — LIDOCAINE HYDROCHLORIDE 2 ML: 10 INJECTION, SOLUTION INFILTRATION; PERINEURAL at 13:16

## 2017-06-27 RX ADMIN — BUPIVACAINE HYDROCHLORIDE 2 ML: 5 INJECTION, SOLUTION PERINEURAL at 13:16

## 2017-06-27 RX ADMIN — METHYLPREDNISOLONE ACETATE 80 MG: 80 INJECTION, SUSPENSION INTRA-ARTICULAR; INTRALESIONAL; INTRAMUSCULAR; SOFT TISSUE at 13:16

## 2017-06-27 RX ADMIN — LIDOCAINE HYDROCHLORIDE 2 ML: 10 INJECTION, SOLUTION INFILTRATION; PERINEURAL at 13:13

## 2017-06-27 RX ADMIN — BUPIVACAINE HYDROCHLORIDE 2 ML: 5 INJECTION, SOLUTION PERINEURAL at 13:13

## 2017-06-27 RX ADMIN — METHYLPREDNISOLONE ACETATE 80 MG: 80 INJECTION, SUSPENSION INTRA-ARTICULAR; INTRALESIONAL; INTRAMUSCULAR; SOFT TISSUE at 13:13

## 2017-06-27 NOTE — PROGRESS NOTES
Large Joint Arthrocentesis  Date/Time: 6/27/2017 1:13 PM  Consent given by: patient  Site marked: site marked  Timeout: Immediately prior to procedure a time out was called to verify the correct patient, procedure, equipment, support staff and site/side marked as required   Supporting Documentation  Indications: pain and joint swelling   Procedure Details  Location: knee - L knee  Preparation: Patient was prepped and draped in the usual sterile fashion  Needle size: 22 G  Approach: anterolateral  Medications administered: 2 mL bupivacaine; 2 mL lidocaine 1 %; 80 mg methylPREDNISolone acetate 80 MG/ML  Patient tolerance: patient tolerated the procedure well with no immediate complications    Large Joint Arthrocentesis  Date/Time: 6/27/2017 1:16 PM  Consent given by: patient  Site marked: site marked  Timeout: Immediately prior to procedure a time out was called to verify the correct patient, procedure, equipment, support staff and site/side marked as required   Supporting Documentation  Indications: pain and joint swelling   Procedure Details  Location: knee - R knee  Preparation: Patient was prepped and draped in the usual sterile fashion  Needle size: 22 G  Approach: anterolateral  Medications administered: 2 mL bupivacaine; 2 mL lidocaine 1 %; 80 mg methylPREDNISolone acetate 80 MG/ML  Patient tolerance: patient tolerated the procedure well with no immediate complications

## 2017-09-05 DIAGNOSIS — E55.9 VITAMIN D DEFICIENCY: ICD-10-CM

## 2017-09-05 RX ORDER — ERGOCALCIFEROL 1.25 MG/1
CAPSULE ORAL
Qty: 12 CAPSULE | Refills: 0 | Status: SHIPPED | OUTPATIENT
Start: 2017-09-05 | End: 2018-01-30 | Stop reason: SDUPTHER

## 2018-01-22 DIAGNOSIS — I10 ESSENTIAL HYPERTENSION: Primary | ICD-10-CM

## 2018-01-22 RX ORDER — NIFEDIPINE 60 MG/1
60 TABLET, EXTENDED RELEASE ORAL DAILY
Qty: 90 TABLET | Refills: 0 | Status: SHIPPED | OUTPATIENT
Start: 2018-01-22 | End: 2018-01-30 | Stop reason: SDUPTHER

## 2018-01-29 RX ORDER — CANAGLIFLOZIN 300 MG/1
TABLET, FILM COATED ORAL
Qty: 90 TABLET | Refills: 0 | Status: SHIPPED | OUTPATIENT
Start: 2018-01-29 | End: 2018-01-30 | Stop reason: SDUPTHER

## 2018-01-29 RX ORDER — LOSARTAN POTASSIUM 100 MG/1
TABLET ORAL
Qty: 90 TABLET | Refills: 0 | Status: SHIPPED | OUTPATIENT
Start: 2018-01-29 | End: 2018-01-30 | Stop reason: SDUPTHER

## 2018-01-29 RX ORDER — HYDROCHLOROTHIAZIDE 12.5 MG/1
CAPSULE, GELATIN COATED ORAL
Qty: 90 CAPSULE | Refills: 0 | Status: SHIPPED | OUTPATIENT
Start: 2018-01-29 | End: 2018-01-30 | Stop reason: SDUPTHER

## 2018-01-30 ENCOUNTER — OFFICE VISIT (OUTPATIENT)
Dept: FAMILY MEDICINE CLINIC | Facility: CLINIC | Age: 58
End: 2018-01-30

## 2018-01-30 ENCOUNTER — TELEPHONE (OUTPATIENT)
Dept: FAMILY MEDICINE CLINIC | Facility: CLINIC | Age: 58
End: 2018-01-30

## 2018-01-30 VITALS
DIASTOLIC BLOOD PRESSURE: 74 MMHG | TEMPERATURE: 98.4 F | SYSTOLIC BLOOD PRESSURE: 146 MMHG | HEIGHT: 71 IN | HEART RATE: 80 BPM | BODY MASS INDEX: 44.1 KG/M2 | WEIGHT: 315 LBS | OXYGEN SATURATION: 93 %

## 2018-01-30 DIAGNOSIS — I10 ESSENTIAL HYPERTENSION: ICD-10-CM

## 2018-01-30 DIAGNOSIS — E78.5 DYSLIPIDEMIA: ICD-10-CM

## 2018-01-30 DIAGNOSIS — Z00.00 HEALTHCARE MAINTENANCE: ICD-10-CM

## 2018-01-30 DIAGNOSIS — M15.9 PRIMARY OSTEOARTHRITIS INVOLVING MULTIPLE JOINTS: ICD-10-CM

## 2018-01-30 DIAGNOSIS — M25.50 ARTHRALGIA, UNSPECIFIED JOINT: ICD-10-CM

## 2018-01-30 DIAGNOSIS — E11.65 TYPE 2 DIABETES MELLITUS WITH HYPERGLYCEMIA, WITHOUT LONG-TERM CURRENT USE OF INSULIN (HCC): Primary | ICD-10-CM

## 2018-01-30 DIAGNOSIS — E55.9 VITAMIN D DEFICIENCY: ICD-10-CM

## 2018-01-30 LAB
POC CREATININE URINE: NORMAL
POC MICROALBUMIN URINE: NORMAL

## 2018-01-30 PROCEDURE — 82044 UR ALBUMIN SEMIQUANTITATIVE: CPT | Performed by: NURSE PRACTITIONER

## 2018-01-30 PROCEDURE — 99214 OFFICE O/P EST MOD 30 MIN: CPT | Performed by: NURSE PRACTITIONER

## 2018-01-30 RX ORDER — MELOXICAM 15 MG/1
15 TABLET ORAL DAILY
Qty: 30 TABLET | Refills: 3 | Status: SHIPPED | OUTPATIENT
Start: 2018-01-30 | End: 2018-02-15 | Stop reason: SDUPTHER

## 2018-01-30 RX ORDER — NIFEDIPINE 60 MG/1
60 TABLET, EXTENDED RELEASE ORAL DAILY
Qty: 90 TABLET | Refills: 3 | Status: SHIPPED | OUTPATIENT
Start: 2018-01-30 | End: 2018-02-22 | Stop reason: SDUPTHER

## 2018-01-30 RX ORDER — ERGOCALCIFEROL 1.25 MG/1
50000 CAPSULE ORAL
Qty: 20 CAPSULE | Refills: 0 | Status: SHIPPED | OUTPATIENT
Start: 2018-01-30 | End: 2018-02-15 | Stop reason: SDUPTHER

## 2018-01-30 RX ORDER — HYDROCODONE BITARTRATE AND ACETAMINOPHEN 5; 325 MG/1; MG/1
1 TABLET ORAL 2 TIMES DAILY PRN
Qty: 60 TABLET | Refills: 0 | Status: SHIPPED | OUTPATIENT
Start: 2018-01-30 | End: 2018-10-22

## 2018-01-30 RX ORDER — HYDROCHLOROTHIAZIDE 12.5 MG/1
12.5 CAPSULE, GELATIN COATED ORAL EVERY MORNING
Qty: 90 CAPSULE | Refills: 3 | Status: SHIPPED | OUTPATIENT
Start: 2018-01-30 | End: 2018-02-19 | Stop reason: SDUPTHER

## 2018-01-30 RX ORDER — LOSARTAN POTASSIUM 100 MG/1
100 TABLET ORAL DAILY
Qty: 90 TABLET | Refills: 3 | Status: SHIPPED | OUTPATIENT
Start: 2018-01-30 | End: 2018-02-19 | Stop reason: SDUPTHER

## 2018-01-30 NOTE — PROGRESS NOTES
Subjective   Pastor Jeter is a 57 y.o. male that is being seen today for medication check/refill for Diabetes, Hypertension, and Dyslipidemia. He is fasting.     History of Present Illness 57-year-old -American male here today to follow-up on long-term medications for diabetes which is Invokana 20 mg tablet per day, metformin 1000 mg tablet twice a day, hypertension treated with hydrochlorothiazide 12.5 mg capsule per day, losartan 100 mg tablet per day and nystatin ranitidine XL 60 mg 24 hour tablet. Patient's dyslipidemia treated with omega-3 fatty acid Fish oil, diet and exercise 30 minutes daily. Patient has gained 11 pounds since June 2017. And has lost 9 friends and family members in the last few months and has become very depressed. Is now working through the depression and wants to get back on track with his weight loss. I explained to patient that having lost that many family and friends in such a short period of time would adversely effect anyone and that if he ate for comfort and gain weight that I would not be myself up just started over and lose the weight the same way you gained little bit at a time.    Review of Systems   Cardiovascular:        Diabetes.  Hypertension.  Dyslipidemia.   All other systems reviewed and are negative.      Objective   Physical Exam   Constitutional: He is oriented to person, place, and time. He appears well-developed and well-nourished.   HENT:   Head: Normocephalic and atraumatic.   Eyes: EOM are normal. Pupils are equal, round, and reactive to light.   Neck: Normal range of motion. Neck supple.   Cardiovascular: Normal rate and regular rhythm.    Pulmonary/Chest: Effort normal and breath sounds normal.   Abdominal: Soft. Bowel sounds are normal.   Musculoskeletal:   Myalgia or arthralgia related to hard work and age.   Neurological: He is alert and oriented to person, place, and time.   Skin: Skin is warm and dry.   Psychiatric: He has a normal mood and affect.  His behavior is normal. Judgment and thought content normal.   Nursing note and vitals reviewed.      Assessment/Plan   Pastor was seen today for med refill and labs only.    Diagnoses and all orders for this visit:    Type 2 diabetes mellitus with hyperglycemia, without long-term current use of insulin  -     CBC & Differential  -     Comprehensive Metabolic Panel  -     Lipid Panel  -     TSH  -     POC Microalbumin  -     Hemoglobin A1c  -     Canagliflozin (INVOKANA) 300 MG tablet; Take 300 mg by mouth Daily.    Dyslipidemia  -     CBC & Differential  -     Comprehensive Metabolic Panel  -     Lipid Panel  -     TSH  -     POC Microalbumin  -     Hemoglobin A1c    Essential hypertension  -     CBC & Differential  -     Comprehensive Metabolic Panel  -     Lipid Panel  -     TSH  -     POC Microalbumin  -     Hemoglobin A1c  -     NIFEdipine XL (NIFEDICAL XL) 60 MG 24 hr tablet; Take 1 tablet by mouth Daily.  -     losartan (COZAAR) 100 MG tablet; Take 1 tablet by mouth Daily.  -     hydrochlorothiazide (MICROZIDE) 12.5 MG capsule; Take 1 capsule by mouth Every Morning.    Vitamin D deficiency  -     vitamin D (ERGOCALCIFEROL) 15568 units capsule capsule; Take 1 capsule by mouth Every 7 (Seven) Days.    Arthralgia, unspecified joint  -     meloxicam (MOBIC) 15 MG tablet; Take 1 tablet by mouth Daily.    Healthcare maintenance  -     pneumococcal conj. 13-valent (PREVNAR-13) vaccine 0.5 mL; Inject 0.5 mL into the shoulder, thigh, or buttocks 1 (One) Time.    Primary osteoarthritis involving multiple joints  -     HYDROcodone-acetaminophen (NORCO) 5-325 MG per tablet; Take 1 tablet by mouth 2 (Two) Times a Day As Needed for Severe Pain .      Patient's in office microalbumin read as normal. Patient aware that he is due for influenza vaccine, colonoscopy and T dap. In office bilateral diabetic foot exam done with monofilament,skin intact with sensation. Small callus area at base of the left little toe.F/U every 6  months and PRN. To notify office if any change in health status.Will think about Tdap.Pt left without getting Prevnar 13.

## 2018-01-30 NOTE — TELEPHONE ENCOUNTER
Left detailed message that he needs a vaccination and he left the office today without getting it. Told him to call the office so we can can give it to him. Did not specify on which vaccination it was for.

## 2018-01-31 LAB
ALBUMIN SERPL-MCNC: 4.7 G/DL (ref 3.5–5.5)
ALBUMIN/GLOB SERPL: 1.6 {RATIO} (ref 1.2–2.2)
ALP SERPL-CCNC: 87 IU/L (ref 39–117)
ALT SERPL-CCNC: 24 IU/L (ref 0–44)
AST SERPL-CCNC: 20 IU/L (ref 0–40)
BASOPHILS # BLD AUTO: 0 X10E3/UL (ref 0–0.2)
BASOPHILS NFR BLD AUTO: 0 %
BILIRUB SERPL-MCNC: 0.4 MG/DL (ref 0–1.2)
BUN SERPL-MCNC: 12 MG/DL (ref 6–24)
BUN/CREAT SERPL: 13 (ref 9–20)
CALCIUM SERPL-MCNC: 9.7 MG/DL (ref 8.7–10.2)
CHLORIDE SERPL-SCNC: 99 MMOL/L (ref 96–106)
CHOLEST SERPL-MCNC: 213 MG/DL (ref 100–199)
CO2 SERPL-SCNC: 24 MMOL/L (ref 18–29)
CREAT SERPL-MCNC: 0.89 MG/DL (ref 0.76–1.27)
EOSINOPHIL # BLD AUTO: 0.2 X10E3/UL (ref 0–0.4)
EOSINOPHIL NFR BLD AUTO: 3 %
ERYTHROCYTE [DISTWIDTH] IN BLOOD BY AUTOMATED COUNT: 13 % (ref 12.3–15.4)
GFR SERPLBLD CREATININE-BSD FMLA CKD-EPI: 108 ML/MIN/1.73
GFR SERPLBLD CREATININE-BSD FMLA CKD-EPI: 94 ML/MIN/1.73
GLOBULIN SER CALC-MCNC: 2.9 G/DL (ref 1.5–4.5)
GLUCOSE SERPL-MCNC: 122 MG/DL (ref 65–99)
HBA1C MFR BLD: 6.6 % (ref 4.8–5.6)
HCT VFR BLD AUTO: 45.3 % (ref 37.5–51)
HDLC SERPL-MCNC: 54 MG/DL
HGB BLD-MCNC: 15.1 G/DL (ref 13–17.7)
IMM GRANULOCYTES # BLD: 0 X10E3/UL (ref 0–0.1)
IMM GRANULOCYTES NFR BLD: 0 %
LDLC SERPL CALC-MCNC: 127 MG/DL (ref 0–99)
LYMPHOCYTES # BLD AUTO: 1.8 X10E3/UL (ref 0.7–3.1)
LYMPHOCYTES NFR BLD AUTO: 33 %
MCH RBC QN AUTO: 31.7 PG (ref 26.6–33)
MCHC RBC AUTO-ENTMCNC: 33.3 G/DL (ref 31.5–35.7)
MCV RBC AUTO: 95 FL (ref 79–97)
MONOCYTES # BLD AUTO: 0.4 X10E3/UL (ref 0.1–0.9)
MONOCYTES NFR BLD AUTO: 7 %
NEUTROPHILS # BLD AUTO: 3.1 X10E3/UL (ref 1.4–7)
NEUTROPHILS NFR BLD AUTO: 57 %
PLATELET # BLD AUTO: 258 X10E3/UL (ref 150–379)
POTASSIUM SERPL-SCNC: 4 MMOL/L (ref 3.5–5.2)
PROT SERPL-MCNC: 7.6 G/DL (ref 6–8.5)
RBC # BLD AUTO: 4.77 X10E6/UL (ref 4.14–5.8)
SODIUM SERPL-SCNC: 141 MMOL/L (ref 134–144)
TRIGL SERPL-MCNC: 159 MG/DL (ref 0–149)
TSH SERPL DL<=0.005 MIU/L-ACNC: 2.04 UIU/ML (ref 0.45–4.5)
VLDLC SERPL CALC-MCNC: 32 MG/DL (ref 5–40)
WBC # BLD AUTO: 5.5 X10E3/UL (ref 3.4–10.8)

## 2018-01-31 PROCEDURE — 90670 PCV13 VACCINE IM: CPT | Performed by: NURSE PRACTITIONER

## 2018-01-31 PROCEDURE — 90471 IMMUNIZATION ADMIN: CPT | Performed by: NURSE PRACTITIONER

## 2018-02-15 DIAGNOSIS — M25.50 ARTHRALGIA, UNSPECIFIED JOINT: ICD-10-CM

## 2018-02-15 DIAGNOSIS — E55.9 VITAMIN D DEFICIENCY: ICD-10-CM

## 2018-02-15 RX ORDER — ERGOCALCIFEROL 1.25 MG/1
50000 CAPSULE ORAL
Qty: 12 CAPSULE | Refills: 0 | Status: SHIPPED | OUTPATIENT
Start: 2018-02-15 | End: 2018-02-19 | Stop reason: SDUPTHER

## 2018-02-15 RX ORDER — MELOXICAM 15 MG/1
15 TABLET ORAL DAILY
Qty: 90 TABLET | Refills: 0 | Status: SHIPPED | OUTPATIENT
Start: 2018-02-15 | End: 2018-02-19 | Stop reason: SDUPTHER

## 2018-02-19 DIAGNOSIS — M25.50 ARTHRALGIA, UNSPECIFIED JOINT: ICD-10-CM

## 2018-02-19 DIAGNOSIS — I10 ESSENTIAL HYPERTENSION: ICD-10-CM

## 2018-02-19 DIAGNOSIS — E55.9 VITAMIN D DEFICIENCY: ICD-10-CM

## 2018-02-19 RX ORDER — BLOOD-GLUCOSE METER
EACH MISCELLANEOUS
Qty: 1 KIT | Refills: 0 | Status: SHIPPED | OUTPATIENT
Start: 2018-02-19

## 2018-02-19 RX ORDER — MELOXICAM 15 MG/1
15 TABLET ORAL DAILY
Qty: 90 TABLET | Refills: 3 | Status: SHIPPED | OUTPATIENT
Start: 2018-02-19 | End: 2018-10-22 | Stop reason: SDUPTHER

## 2018-02-19 RX ORDER — LANCETS
EACH MISCELLANEOUS
Qty: 100 EACH | Refills: 3 | Status: SHIPPED | OUTPATIENT
Start: 2018-02-19 | End: 2019-02-19

## 2018-02-19 RX ORDER — HYDROCHLOROTHIAZIDE 12.5 MG/1
12.5 CAPSULE, GELATIN COATED ORAL EVERY MORNING
Qty: 90 CAPSULE | Refills: 3 | Status: SHIPPED | OUTPATIENT
Start: 2018-02-19 | End: 2018-10-22 | Stop reason: SDUPTHER

## 2018-02-19 RX ORDER — ERGOCALCIFEROL 1.25 MG/1
50000 CAPSULE ORAL
Qty: 12 CAPSULE | Refills: 3 | Status: SHIPPED | OUTPATIENT
Start: 2018-02-19 | End: 2018-10-22 | Stop reason: SDUPTHER

## 2018-02-19 RX ORDER — LOSARTAN POTASSIUM 100 MG/1
100 TABLET ORAL DAILY
Qty: 90 TABLET | Refills: 3 | Status: SHIPPED | OUTPATIENT
Start: 2018-02-19 | End: 2018-10-22 | Stop reason: SDUPTHER

## 2018-02-22 ENCOUNTER — TELEPHONE (OUTPATIENT)
Dept: FAMILY MEDICINE CLINIC | Facility: CLINIC | Age: 58
End: 2018-02-22

## 2018-02-22 DIAGNOSIS — E11.65 TYPE 2 DIABETES MELLITUS WITH HYPERGLYCEMIA, WITHOUT LONG-TERM CURRENT USE OF INSULIN (HCC): ICD-10-CM

## 2018-02-22 DIAGNOSIS — I10 ESSENTIAL HYPERTENSION: ICD-10-CM

## 2018-02-22 RX ORDER — NIFEDIPINE 60 MG/1
60 TABLET, EXTENDED RELEASE ORAL DAILY
Qty: 90 TABLET | Refills: 3 | Status: SHIPPED | OUTPATIENT
Start: 2018-02-22 | End: 2018-10-22 | Stop reason: SDUPTHER

## 2018-02-22 NOTE — TELEPHONE ENCOUNTER
Patient called and he said he talked with his mail order pharmacy [facility] and they are needing a new prescripton for his Invokana 300 mg, nifedipine xl 60 mg and metformin 1000 mg  His best call back is [phone]

## 2018-02-22 NOTE — TELEPHONE ENCOUNTER
Rx sent to pharmacy as pt requested. Left detailed message on pt's phone. Did not specify on what medications or pharmacy.

## 2018-10-22 ENCOUNTER — OFFICE VISIT (OUTPATIENT)
Dept: FAMILY MEDICINE CLINIC | Facility: CLINIC | Age: 58
End: 2018-10-22

## 2018-10-22 VITALS
WEIGHT: 315 LBS | SYSTOLIC BLOOD PRESSURE: 124 MMHG | DIASTOLIC BLOOD PRESSURE: 74 MMHG | TEMPERATURE: 97.8 F | RESPIRATION RATE: 16 BRPM | BODY MASS INDEX: 44.1 KG/M2 | HEIGHT: 71 IN | OXYGEN SATURATION: 99 % | HEART RATE: 72 BPM

## 2018-10-22 DIAGNOSIS — I10 ESSENTIAL HYPERTENSION: ICD-10-CM

## 2018-10-22 DIAGNOSIS — E55.9 AVITAMINOSIS D: ICD-10-CM

## 2018-10-22 DIAGNOSIS — E55.9 VITAMIN D DEFICIENCY: ICD-10-CM

## 2018-10-22 DIAGNOSIS — N52.9 ERECTILE DYSFUNCTION, UNSPECIFIED ERECTILE DYSFUNCTION TYPE: ICD-10-CM

## 2018-10-22 DIAGNOSIS — E11.65 TYPE 2 DIABETES MELLITUS WITH HYPERGLYCEMIA, WITHOUT LONG-TERM CURRENT USE OF INSULIN (HCC): Primary | ICD-10-CM

## 2018-10-22 DIAGNOSIS — E78.5 DYSLIPIDEMIA: ICD-10-CM

## 2018-10-22 DIAGNOSIS — M25.50 ARTHRALGIA, UNSPECIFIED JOINT: ICD-10-CM

## 2018-10-22 LAB
25(OH)D3+25(OH)D2 SERPL-MCNC: 68.7 NG/ML (ref 30–100)
ALBUMIN SERPL-MCNC: 4.2 G/DL (ref 3.5–5.2)
ALBUMIN/GLOB SERPL: 1.3 G/DL
ALP SERPL-CCNC: 87 U/L (ref 39–117)
ALT SERPL-CCNC: 23 U/L (ref 1–41)
AST SERPL-CCNC: 13 U/L (ref 1–40)
BASOPHILS # BLD AUTO: 0.04 10*3/MM3 (ref 0–0.2)
BASOPHILS NFR BLD AUTO: 0.6 % (ref 0–1.5)
BILIRUB SERPL-MCNC: 0.2 MG/DL (ref 0.1–1.2)
BUN SERPL-MCNC: 18 MG/DL (ref 8–23)
BUN/CREAT SERPL: 19.6 (ref 7–25)
CALCIUM SERPL-MCNC: 9.8 MG/DL (ref 8.6–10.5)
CHLORIDE SERPL-SCNC: 104 MMOL/L (ref 98–107)
CHOLEST SERPL-MCNC: 178 MG/DL (ref 0–200)
CO2 SERPL-SCNC: 27 MMOL/L (ref 22–29)
CREAT SERPL-MCNC: 0.92 MG/DL (ref 0.76–1.27)
EOSINOPHIL # BLD AUTO: 0.25 10*3/MM3 (ref 0–0.7)
EOSINOPHIL NFR BLD AUTO: 3.7 % (ref 0.3–6.2)
ERYTHROCYTE [DISTWIDTH] IN BLOOD BY AUTOMATED COUNT: 13.3 % (ref 11.5–14.5)
EXPIRATION DATE: ABNORMAL
GLOBULIN SER CALC-MCNC: 3.2 GM/DL
GLUCOSE SERPL-MCNC: 133 MG/DL (ref 65–99)
HBA1C MFR BLD: 7.3 %
HCT VFR BLD AUTO: 43.6 % (ref 40.4–52.2)
HDLC SERPL-MCNC: 51 MG/DL (ref 40–60)
HGB BLD-MCNC: 14.2 G/DL (ref 13.7–17.6)
IMM GRANULOCYTES # BLD: 0.02 10*3/MM3 (ref 0–0.03)
IMM GRANULOCYTES NFR BLD: 0.3 % (ref 0–0.5)
LDLC SERPL CALC-MCNC: 109 MG/DL (ref 0–100)
LYMPHOCYTES # BLD AUTO: 2.04 10*3/MM3 (ref 0.9–4.8)
LYMPHOCYTES NFR BLD AUTO: 30.1 % (ref 19.6–45.3)
Lab: 918
MCH RBC QN AUTO: 32.1 PG (ref 27–32.7)
MCHC RBC AUTO-ENTMCNC: 32.6 G/DL (ref 32.6–36.4)
MCV RBC AUTO: 98.6 FL (ref 79.8–96.2)
MONOCYTES # BLD AUTO: 0.53 10*3/MM3 (ref 0.2–1.2)
MONOCYTES NFR BLD AUTO: 7.8 % (ref 5–12)
NEUTROPHILS # BLD AUTO: 3.92 10*3/MM3 (ref 1.9–8.1)
NEUTROPHILS NFR BLD AUTO: 57.8 % (ref 42.7–76)
PLATELET # BLD AUTO: 236 10*3/MM3 (ref 140–500)
POC ACETONE URINE: NORMAL
POC CREATININE URINE: NORMAL
POC MICROALBUMIN URINE: NORMAL
POTASSIUM SERPL-SCNC: 4.3 MMOL/L (ref 3.5–5.2)
PROT SERPL-MCNC: 7.4 G/DL (ref 6–8.5)
RBC # BLD AUTO: 4.42 10*6/MM3 (ref 4.6–6)
SODIUM SERPL-SCNC: 144 MMOL/L (ref 136–145)
TRIGL SERPL-MCNC: 91 MG/DL (ref 0–150)
TSH SERPL DL<=0.005 MIU/L-ACNC: 0.8 MIU/ML (ref 0.27–4.2)
VLDLC SERPL CALC-MCNC: 18.2 MG/DL (ref 5–40)
WBC # BLD AUTO: 6.78 10*3/MM3 (ref 4.5–10.7)

## 2018-10-22 PROCEDURE — 83036 HEMOGLOBIN GLYCOSYLATED A1C: CPT | Performed by: NURSE PRACTITIONER

## 2018-10-22 PROCEDURE — 82044 UR ALBUMIN SEMIQUANTITATIVE: CPT | Performed by: NURSE PRACTITIONER

## 2018-10-22 PROCEDURE — 99214 OFFICE O/P EST MOD 30 MIN: CPT | Performed by: NURSE PRACTITIONER

## 2018-10-22 RX ORDER — HYDROCHLOROTHIAZIDE 12.5 MG/1
12.5 CAPSULE, GELATIN COATED ORAL EVERY MORNING
Qty: 90 CAPSULE | Refills: 3 | Status: SHIPPED | OUTPATIENT
Start: 2018-10-22 | End: 2019-02-25 | Stop reason: SDUPTHER

## 2018-10-22 RX ORDER — MELOXICAM 15 MG/1
15 TABLET ORAL DAILY
Qty: 90 TABLET | Refills: 3 | Status: SHIPPED | OUTPATIENT
Start: 2018-10-22 | End: 2019-02-25 | Stop reason: SDUPTHER

## 2018-10-22 RX ORDER — NIFEDIPINE 60 MG/1
60 TABLET, EXTENDED RELEASE ORAL DAILY
Qty: 90 TABLET | Refills: 3 | Status: SHIPPED | OUTPATIENT
Start: 2018-10-22 | End: 2019-02-25 | Stop reason: SDUPTHER

## 2018-10-22 RX ORDER — SILDENAFIL CITRATE 20 MG/1
TABLET ORAL
Qty: 6 TABLET | Refills: 0 | Status: SHIPPED | OUTPATIENT
Start: 2018-10-22

## 2018-10-22 RX ORDER — ERGOCALCIFEROL 1.25 MG/1
50000 CAPSULE ORAL
Qty: 12 CAPSULE | Refills: 3 | Status: SHIPPED | OUTPATIENT
Start: 2018-10-22 | End: 2019-02-25 | Stop reason: SDUPTHER

## 2018-10-22 RX ORDER — LOSARTAN POTASSIUM 100 MG/1
100 TABLET ORAL DAILY
Qty: 90 TABLET | Refills: 3 | Status: SHIPPED | OUTPATIENT
Start: 2018-10-22 | End: 2019-02-25 | Stop reason: SDUPTHER

## 2018-10-22 RX ORDER — SILDENAFIL CITRATE 20 MG/1
TABLET ORAL
Qty: 6 TABLET | Refills: 0 | Status: SHIPPED | OUTPATIENT
Start: 2018-10-22 | End: 2018-10-22 | Stop reason: SDUPTHER

## 2018-10-22 NOTE — PROGRESS NOTES
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 male. Patient is being seen today for medication management on hypertension, diabetes, and vitamin d deficiency.     History of Present Illness [place]-ASR ADVANCED JOINT, over-the-counter meds that patient is taking for his arthritic joints and he says it works very well. Being seen today for long-term medication use for hypertension which is Microzide 12.5 mg capsule, losartan 100 mg tablet and nifedipine XL 60 mg 24 hour tablet. Diabetes treated with Invokana 300 mg tablet, Glucophage 1000 mg tablet twice a day and vitamin D deficiency treated with vitamin D 50,000 international units per week. Offers no complaints with medication well refill meds as needed    The following portions of the patient's history were reviewed and updated as appropriate: allergies, current medications, past family history, past medical history, past social history, past surgical history and problem list.    Review of Systems   Cardiovascular:        Hypertension.   Diabetes.    Hematological:        Vitamin d deficiency.    All other systems reviewed and are negative.      Objective   Physical Exam   Constitutional: He is oriented to person, place, and time. He appears well-developed.   HENT:   Head: Normocephalic and atraumatic.   Eyes: Pupils are equal, round, and reactive to light. EOM are normal.   Neck: Normal range of motion. Neck supple.   Cardiovascular: Normal rate and regular rhythm.    Pulmonary/Chest: Effort normal and breath sounds normal.   Abdominal: Soft. Bowel sounds are normal.   Musculoskeletal: Normal range of motion.   Neurological: He is alert and oriented to person, place, and time.   Skin: Skin is warm and dry. Capillary refill takes less than 2 seconds.   Psychiatric: He has a normal mood and affect. His behavior is normal. Judgment and thought content normal.   Nursing note and vitals reviewed.        Assessment/Plan   Pastor was seen today for med  management.    Diagnoses and all orders for this visit:    Type 2 diabetes mellitus with hyperglycemia, without long-term current use of insulin (CMS/[facility])  -     POCT glycated hemoglobin, total  -     POCT microalbumin  -     CBC & Differential  -     Comprehensive Metabolic Panel  -     Lipid Panel  -     TSH  -     Vitamin D 25 Hydroxy  -     Canagliflozin (INVOKANA) 300 MG tablet; Take 300 mg by mouth Daily.    Avitaminosis D  -     Vitamin D 25 Hydroxy    Essential hypertension  -     CBC & Differential  -     Comprehensive Metabolic Panel  -     Lipid Panel  -     TSH  -     hydrochlorothiazide (MICROZIDE) 12.5 MG capsule; Take 1 capsule by mouth Every Morning.  -     losartan (COZAAR) 100 MG tablet; Take 1 tablet by mouth Daily.  -     NIFEdipine XL (NIFEDICAL XL) 60 MG 24 hr tablet; Take 1 tablet by mouth Daily.    Dyslipidemia  -     Lipid Panel    Arthralgia, unspecified joint  -     meloxicam (MOBIC) 15 MG tablet; Take 1 tablet by mouth Daily.    Vitamin D deficiency  -     vitamin D (ERGOCALCIFEROL) 23710 units capsule capsule; Take 1 capsule by mouth Every 7 (Seven) Days.    Erectile dysfunction, unspecified erectile dysfunction type  -     sildenafil (REVATIO) 20 MG tablet; Take 1-5 tablets as needed 1 hour before activity    Other orders  -     metFORMIN (GLUCOPHAGE) 1000 MG tablet; Take 1 tablet by mouth 2 (Two) Times a Day With Meals.      In office A1c 7.3. And microalbumin normal Labs drawn today will be called once resulted. Medications renewed as needed. Patient to continue eating a low-fat high-fiber diet full of fruits and vegetables drinking six-day glasses of water a day and exercising 30 minutes every day to raise his heart rate. This exercise will maintain good bone density, muscle mass, heart health and has been known to elevate the psyche. To follow-up every 6 months and as needed. Notifying us immediately of any decline in health status.

## 2019-01-24 DIAGNOSIS — E11.65 TYPE 2 DIABETES MELLITUS WITH HYPERGLYCEMIA, WITHOUT LONG-TERM CURRENT USE OF INSULIN (HCC): ICD-10-CM

## 2019-01-25 DIAGNOSIS — E11.8 TYPE 2 DIABETES MELLITUS WITH COMPLICATION, WITHOUT LONG-TERM CURRENT USE OF INSULIN (HCC): Primary | ICD-10-CM

## 2019-02-25 ENCOUNTER — TELEPHONE (OUTPATIENT)
Dept: FAMILY MEDICINE CLINIC | Facility: CLINIC | Age: 59
End: 2019-02-25

## 2019-02-25 DIAGNOSIS — E11.8 TYPE 2 DIABETES MELLITUS WITH COMPLICATION, WITHOUT LONG-TERM CURRENT USE OF INSULIN (HCC): ICD-10-CM

## 2019-02-25 DIAGNOSIS — I10 ESSENTIAL HYPERTENSION: ICD-10-CM

## 2019-02-25 DIAGNOSIS — M25.50 ARTHRALGIA, UNSPECIFIED JOINT: ICD-10-CM

## 2019-02-25 DIAGNOSIS — E55.9 VITAMIN D DEFICIENCY: ICD-10-CM

## 2019-02-25 RX ORDER — NIFEDIPINE 60 MG/1
60 TABLET, EXTENDED RELEASE ORAL DAILY
Qty: 90 TABLET | Refills: 1 | Status: SHIPPED | OUTPATIENT
Start: 2019-02-25 | End: 2019-02-25 | Stop reason: SDUPTHER

## 2019-02-25 RX ORDER — ERGOCALCIFEROL 1.25 MG/1
50000 CAPSULE ORAL
Qty: 12 CAPSULE | Refills: 1 | Status: SHIPPED | OUTPATIENT
Start: 2019-02-25 | End: 2019-07-30 | Stop reason: SDUPTHER

## 2019-02-25 RX ORDER — LOSARTAN POTASSIUM 100 MG/1
100 TABLET ORAL DAILY
Qty: 90 TABLET | Refills: 1 | Status: SHIPPED | OUTPATIENT
Start: 2019-02-25 | End: 2019-02-25 | Stop reason: SDUPTHER

## 2019-02-25 RX ORDER — HYDROCHLOROTHIAZIDE 12.5 MG/1
12.5 CAPSULE, GELATIN COATED ORAL EVERY MORNING
Qty: 90 CAPSULE | Refills: 1 | Status: SHIPPED | OUTPATIENT
Start: 2019-02-25 | End: 2019-02-25 | Stop reason: SDUPTHER

## 2019-02-25 RX ORDER — ERGOCALCIFEROL 1.25 MG/1
50000 CAPSULE ORAL
Qty: 12 CAPSULE | Refills: 1 | Status: SHIPPED | OUTPATIENT
Start: 2019-02-25 | End: 2019-02-25 | Stop reason: SDUPTHER

## 2019-02-25 RX ORDER — MELOXICAM 15 MG/1
15 TABLET ORAL DAILY
Qty: 90 TABLET | Refills: 1 | Status: SHIPPED | OUTPATIENT
Start: 2019-02-25 | End: 2019-07-30 | Stop reason: SDUPTHER

## 2019-02-25 RX ORDER — LOSARTAN POTASSIUM 100 MG/1
100 TABLET ORAL DAILY
Qty: 90 TABLET | Refills: 1 | Status: SHIPPED | OUTPATIENT
Start: 2019-02-25 | End: 2019-07-30 | Stop reason: SDUPTHER

## 2019-02-25 RX ORDER — NIFEDIPINE 60 MG/1
60 TABLET, EXTENDED RELEASE ORAL DAILY
Qty: 90 TABLET | Refills: 1 | Status: SHIPPED | OUTPATIENT
Start: 2019-02-25 | End: 2019-11-18 | Stop reason: SDUPTHER

## 2019-02-25 RX ORDER — HYDROCHLOROTHIAZIDE 12.5 MG/1
12.5 CAPSULE, GELATIN COATED ORAL EVERY MORNING
Qty: 90 CAPSULE | Refills: 1 | Status: SHIPPED | OUTPATIENT
Start: 2019-02-25 | End: 2019-07-30 | Stop reason: SDUPTHER

## 2019-02-25 NOTE — TELEPHONE ENCOUNTER
Pt is on Jardiance not Invokana. He had a formulary change written by [facility] on the medicine.   I have reordered all other medications. Okay to refill the Meloxicam?

## 2019-02-25 NOTE — TELEPHONE ENCOUNTER
Patient came in and he is needing refills of metformin 1000 mg, hydrocholorthiazide 12.5 mg, losartan 100 mg, meloxicam 15 mg, nifedipene er 60 mg, invokana 300 mg and vitamin d 20957 [facility] [phone]    If you have questions please call patient at [phone]    Please let patient know when this has been done.  Thanks

## 2019-03-25 ENCOUNTER — OFFICE VISIT (OUTPATIENT)
Dept: FAMILY MEDICINE CLINIC | Facility: CLINIC | Age: 59
End: 2019-03-25

## 2019-03-25 VITALS
DIASTOLIC BLOOD PRESSURE: 76 MMHG | HEART RATE: 90 BPM | WEIGHT: 315 LBS | SYSTOLIC BLOOD PRESSURE: 140 MMHG | OXYGEN SATURATION: 98 % | HEIGHT: 71 IN | BODY MASS INDEX: 44.1 KG/M2 | TEMPERATURE: 98.4 F

## 2019-03-25 DIAGNOSIS — E11.65 TYPE 2 DIABETES MELLITUS WITH HYPERGLYCEMIA, WITHOUT LONG-TERM CURRENT USE OF INSULIN (HCC): Primary | ICD-10-CM

## 2019-03-25 DIAGNOSIS — E55.9 VITAMIN D DEFICIENCY: ICD-10-CM

## 2019-03-25 DIAGNOSIS — I10 ESSENTIAL HYPERTENSION: ICD-10-CM

## 2019-03-25 DIAGNOSIS — N52.9 ERECTILE DYSFUNCTION, UNSPECIFIED ERECTILE DYSFUNCTION TYPE: ICD-10-CM

## 2019-03-25 DIAGNOSIS — E78.5 DYSLIPIDEMIA: ICD-10-CM

## 2019-03-25 LAB
A/C: NORMAL
BILIRUB BLD-MCNC: NEGATIVE MG/DL
CLARITY, POC: CLEAR
COLOR UR: YELLOW
EXPIRATION DATE: ABNORMAL
GLUCOSE UR STRIP-MCNC: ABNORMAL MG/DL
HBA1C MFR BLD: 7.4 %
KETONES UR QL: NEGATIVE
LEUKOCYTE EST, POC: NEGATIVE
Lab: 986
NITRITE UR-MCNC: NEGATIVE MG/ML
PH UR: 5.5 [PH] (ref 5–8)
POC CREATININE URINE: NORMAL
POC MICROALBUMIN URINE: NORMAL
PROT UR STRIP-MCNC: NEGATIVE MG/DL
RBC # UR STRIP: NEGATIVE /UL
SP GR UR: 1.02 (ref 1–1.03)
UROBILINOGEN UR QL: NORMAL

## 2019-03-25 PROCEDURE — 81003 URINALYSIS AUTO W/O SCOPE: CPT | Performed by: PHYSICIAN ASSISTANT

## 2019-03-25 PROCEDURE — 99214 OFFICE O/P EST MOD 30 MIN: CPT | Performed by: PHYSICIAN ASSISTANT

## 2019-03-25 PROCEDURE — 83036 HEMOGLOBIN GLYCOSYLATED A1C: CPT | Performed by: PHYSICIAN ASSISTANT

## 2019-03-25 PROCEDURE — 82044 UR ALBUMIN SEMIQUANTITATIVE: CPT | Performed by: PHYSICIAN ASSISTANT

## 2019-03-25 NOTE — PROGRESS NOTES
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 Patient here today for medication management for diabetes, hypertension, Vitamin D deficiency     History of Present Illness     Taking Prosvent to help with frequent urination.     Taking Invokana when he wakes up for work. Reports GI intolerance to Jardiance. Also Janumet- did ok. Did not do as well with Januvia alone. Metformin just started twice daily for a couple weeks. Prior,when took 2nd pill,made him sleepy but now is not bothering him.     States he went for [employer] CPE and they advised he was advised to talk with PCP about protein and glucose in urine. 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     Brother  1 year ago and has been drinking more. Reports started drinking more the past 3 months. States he is drinking an beer and 3 shots of whiskey every other day. Stopped a couple weeks ago. 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 States he had depression and reports this has resolved. Lost 40 lbs when his 1st brother . States he then gained weight when his other brother .     Reports he split from his children's mother when they were children, and \"they don't come around\" except at [place].   Has 5 sisters and 1 brother remaining. 1 brother  of colon cancer and the most recent  of diabetes- had renal failure and was on dialysis.     Reports arthritis is not that bad now. Has been taking Meloxicam daily.     The following portions of the patient's history were reviewed and updated as appropriate: allergies, current medications, past family history, past medical history, past social history, past surgical history and problem list.    Review of Systems   All other systems reviewed and are negative.      Objective   Physical Exam   Constitutional: He is oriented to person, place, and time. He appears well-developed.   HENT:   Head: Normocephalic and atraumatic.   Right Ear: External ear normal.   Left Ear: External ear normal.   Eyes: " Conjunctivae are normal.   Neck: Carotid bruit is not present. No tracheal deviation present. No thyroid mass and no thyromegaly present.   Cardiovascular: Normal rate, regular rhythm, normal heart sounds and intact distal pulses.   Pulmonary/Chest: Effort normal and breath sounds normal.   Musculoskeletal: He exhibits edema (1+ pitting edema).   Neurological: He is alert and oriented to person, place, and time. Gait normal.   Skin: Skin is warm and dry.   Psychiatric: He has a normal mood and affect. His behavior is normal. Judgment and thought content normal.   Nursing note and vitals reviewed.      Assessment/Plan   Pastor was seen today for med management.    Diagnoses and all orders for this visit:    Type 2 diabetes mellitus with hyperglycemia, without long-term current use of insulin (CMS/[facility])  -     POCT glycated hemoglobin, total  -     Canagliflozin (INVOKANA) 300 MG tablet; Take 300 mg by mouth Daily.  -     CBC & Differential  -     Comprehensive Metabolic Panel  -     POC Urinalysis Dipstick, Automated  -     POC Microalbumin    Essential hypertension  -     CBC & Differential  -     Comprehensive Metabolic Panel  -     POC Urinalysis Dipstick, Automated  -     POC Microalbumin    Vitamin D deficiency  -     CBC & Differential  -     POC Urinalysis Dipstick, Automated  -     POC Microalbumin  -     Vitamin D 25 Hydroxy    Dyslipidemia  -     CBC & Differential  -     Comprehensive Metabolic Panel  -     CK  -     Lipid Panel With LDL / HDL Ratio  -     POC Urinalysis Dipstick, Automated  -     POC Microalbumin    Erectile dysfunction, unspecified erectile dysfunction type  -     CBC & Differential  -     POC Urinalysis Dipstick, Automated  -     POC Microalbumin      Patient Instructions   58-year-old male who presents today in follow-up of diabetes, hypertension, hyperlipidemia, vitamin D deficiency, arthritis, possible BPH with urinary frequency versus AE to Invokana.  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  Patient is on an SGL T-2, so glucose and urine is normal.  He does however have protein in his urine at [employer].  We will repeat urinalysis with microalbumin today.  Previous microalbumin was negative.  A1c was previously elevated at 7.3% and is 7.4% today.  He reports drinking a beer and a few shots every other day for about 3 months.  He just stopped drinking in the last couple weeks.  Avoidance of alcohol will improve his A1c control.  He also just restarted metformin twice daily a couple weeks ago.  He reports that he had previous intolerance to metformin twice daily but since he has restarted twice daily has had no issues. He would like to stay on Invokana 300 mg once daily- no Hx PAD or amputation. He is staying hydrated and has tolerated it without AE. Previous AE with Jardiance. Patient reported possible intolerance to Januvia but reports he did ok with Janumet. He also had previous intolerance to Metformin twice dialy but reports no AE since restarting at twice daily for a few weeks. We will have him continue twice daily Metformin and Invokana and follow up with [facility] in 3 months. Recheck A1C then and determine if he may need to take Janumet and Invokana.  I advised that he needs to be diligent about examination of his feet and lower extremities.  He should ensure no infections or lesions.  He will need to be seen ASAP if he has any skin breakdown or concern for infection.      Blood pressure today is elevated and he has 1+ pitting edema.  He has been taking Mobic daily.  I discussed risks of NSAIDs, including cardiovascular and cerebrovascular risks, renal and hepatic concerns, GI bleeding, elevated blood pressure and edema.  Would like to try stopping his Mobic.  I will not removed from his medication list, however if he feels it is problematic to be off medication, he and [facility] need to discuss risks and benefits and other possible  treatments.  He apparently had a brother that  about a year ago with diabetes and renal failure on dialysis.  He will have fasting labs today.  Call if no results in 1 week.  Further recommendations pending results.    Patient also has urinary frequency with nocturia.  He has been taking an over-the-counter supplement.  Patient to consider further workup with [facility] at follow-up.  We need to ensure up-to-date PSA, consideration of OMAIRA, versus urology referral.    He did have some depression with some alcohol use after his brother's death.  Consideration of treatment for depression versus counseling if he has persistent symptoms.  He has avoided alcohol and will continue to abstain.

## 2019-03-25 NOTE — PATIENT INSTRUCTIONS
58-year-old male who presents today in follow-up of diabetes, hypertension, hyperlipidemia, vitamin D deficiency, arthritis, possible BPH with urinary frequency versus AE to Invokana.  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  Patient is on an SGL T-2, so glucose and urine is normal.  He does however have protein in his urine at [employer].  We will repeat urinalysis with microalbumin today.  Previous microalbumin was negative.  A1c was previously elevated at 7.3% and is 7.4% today.  He reports drinking a beer and a few shots every other day for about 3 months.  He just stopped drinking in the last couple weeks.  Avoidance of alcohol will improve his A1c control.  He also just restarted metformin twice daily a couple weeks ago.  He reports that he had previous intolerance to metformin twice daily but since he has restarted twice daily has had no issues. He would like to stay on Invokana 300 mg once daily- no Hx PAD or amputation. He is staying hydrated and has tolerated it without AE. Previous AE with Jardiance. Patient reported possible intolerance to Januvia but reports he did ok with Janumet. He also had previous intolerance to Metformin twice dialy but reports no AE since restarting at twice daily for a few weeks. We will have him continue twice daily Metformin and Invokana and follow up with [facility] in 3 months. Recheck A1C then and determine if he may need to take Janumet and Invokana.  I advised that he needs to be diligent about examination of his feet and lower extremities.  He should ensure no infections or lesions.  He will need to be seen ASAP if he has any skin breakdown or concern for infection.      Blood pressure today is elevated and he has 1+ pitting edema.  He has been taking Mobic daily.  I discussed risks of NSAIDs, including cardiovascular and cerebrovascular risks, renal and hepatic concerns, GI bleeding, elevated blood pressure and  edema.  Would like to try stopping his Mobic.  I will not removed from his medication list, however if he feels it is problematic to be off medication, he and [facility] need to discuss risks and benefits and other possible treatments.  He apparently had a brother that  about a year ago with diabetes and renal failure on dialysis.  He will have fasting labs today.  Call if no results in 1 week.  Further recommendations pending results.    Patient also has urinary frequency with nocturia.  He has been taking an over-the-counter supplement.  Patient to consider further workup with [facility] at follow-up.  We need to ensure up-to-date PSA, consideration of OMAIRA, versus urology referral.    He did have some depression with some alcohol use after his brother's death.  Consideration of treatment for depression versus counseling if he has persistent symptoms.  He has avoided alcohol and will continue to abstain.

## 2019-03-26 LAB
25(OH)D3+25(OH)D2 SERPL-MCNC: 46 NG/ML (ref 30–100)
ALBUMIN SERPL-MCNC: 4.5 G/DL (ref 3.6–4.8)
ALBUMIN/GLOB SERPL: 1.5 {RATIO} (ref 1.2–2.2)
ALP SERPL-CCNC: 81 IU/L (ref 39–117)
ALT SERPL-CCNC: 28 IU/L (ref 0–44)
AST SERPL-CCNC: 22 IU/L (ref 0–40)
BASOPHILS # BLD AUTO: 0 X10E3/UL (ref 0–0.2)
BASOPHILS NFR BLD AUTO: 1 %
BILIRUB SERPL-MCNC: 0.4 MG/DL (ref 0–1.2)
BUN SERPL-MCNC: 14 MG/DL (ref 8–27)
BUN/CREAT SERPL: 15 (ref 10–24)
CALCIUM SERPL-MCNC: 9.6 MG/DL (ref 8.6–10.2)
CHLORIDE SERPL-SCNC: 102 MMOL/L (ref 96–106)
CHOLEST SERPL-MCNC: 198 MG/DL (ref 100–199)
CK SERPL-CCNC: 400 U/L (ref 24–204)
CO2 SERPL-SCNC: 21 MMOL/L (ref 20–29)
CREAT SERPL-MCNC: 0.92 MG/DL (ref 0.76–1.27)
EOSINOPHIL # BLD AUTO: 0.2 X10E3/UL (ref 0–0.4)
EOSINOPHIL NFR BLD AUTO: 4 %
ERYTHROCYTE [DISTWIDTH] IN BLOOD BY AUTOMATED COUNT: 12.7 % (ref 12.3–15.4)
GLOBULIN SER CALC-MCNC: 3 G/DL (ref 1.5–4.5)
GLUCOSE SERPL-MCNC: 113 MG/DL (ref 65–99)
HCT VFR BLD AUTO: 43.9 % (ref 37.5–51)
HDLC SERPL-MCNC: 49 MG/DL
HGB BLD-MCNC: 14.5 G/DL (ref 13–17.7)
IMM GRANULOCYTES # BLD AUTO: 0 X10E3/UL (ref 0–0.1)
IMM GRANULOCYTES NFR BLD AUTO: 0 %
LDLC SERPL CALC-MCNC: 125 MG/DL (ref 0–99)
LDLC/HDLC SERPL: 2.6 RATIO (ref 0–3.6)
LYMPHOCYTES # BLD AUTO: 1.7 X10E3/UL (ref 0.7–3.1)
LYMPHOCYTES NFR BLD AUTO: 32 %
MCH RBC QN AUTO: 31.5 PG (ref 26.6–33)
MCHC RBC AUTO-ENTMCNC: 33 G/DL (ref 31.5–35.7)
MCV RBC AUTO: 95 FL (ref 79–97)
MONOCYTES # BLD AUTO: 0.5 X10E3/UL (ref 0.1–0.9)
MONOCYTES NFR BLD AUTO: 9 %
NEUTROPHILS # BLD AUTO: 2.9 X10E3/UL (ref 1.4–7)
NEUTROPHILS NFR BLD AUTO: 54 %
PLATELET # BLD AUTO: 256 X10E3/UL (ref 150–379)
POTASSIUM SERPL-SCNC: 4.2 MMOL/L (ref 3.5–5.2)
PROT SERPL-MCNC: 7.5 G/DL (ref 6–8.5)
RBC # BLD AUTO: 4.61 X10E6/UL (ref 4.14–5.8)
SODIUM SERPL-SCNC: 140 MMOL/L (ref 134–144)
TRIGL SERPL-MCNC: 121 MG/DL (ref 0–149)
VLDLC SERPL CALC-MCNC: 24 MG/DL (ref 5–40)
WBC # BLD AUTO: 5.4 X10E3/UL (ref 3.4–10.8)

## 2019-04-03 DIAGNOSIS — R74.8 ELEVATED CK: Primary | ICD-10-CM

## 2019-04-03 DIAGNOSIS — E78.5 DYSLIPIDEMIA: ICD-10-CM

## 2019-04-19 ENCOUNTER — TELEPHONE (OUTPATIENT)
Dept: FAMILY MEDICINE CLINIC | Facility: CLINIC | Age: 59
End: 2019-04-19

## 2019-04-19 DIAGNOSIS — E78.5 DYSLIPIDEMIA: ICD-10-CM

## 2019-04-19 DIAGNOSIS — E55.9 VITAMIN D DEFICIENCY: ICD-10-CM

## 2019-04-19 DIAGNOSIS — E11.65 TYPE 2 DIABETES MELLITUS WITH HYPERGLYCEMIA, WITHOUT LONG-TERM CURRENT USE OF INSULIN (HCC): Primary | ICD-10-CM

## 2019-04-19 NOTE — TELEPHONE ENCOUNTER
Pt began on Trulicity 0.75 mg.   After his first dose he noticed shakes in his right hand, his head felt weird (like a buzz), and his heart rate was up.   I did speak to you about this before pt left.   He was informed to not take his shot tonight and he is going to be referred to Endocrinology.

## 2019-07-29 ENCOUNTER — OFFICE VISIT (OUTPATIENT)
Dept: FAMILY MEDICINE CLINIC | Facility: CLINIC | Age: 59
End: 2019-07-29

## 2019-07-29 VITALS
OXYGEN SATURATION: 98 % | BODY MASS INDEX: 44.1 KG/M2 | HEART RATE: 73 BPM | DIASTOLIC BLOOD PRESSURE: 80 MMHG | SYSTOLIC BLOOD PRESSURE: 142 MMHG | TEMPERATURE: 98.4 F | HEIGHT: 71 IN | WEIGHT: 315 LBS

## 2019-07-29 DIAGNOSIS — M15.9 PRIMARY OSTEOARTHRITIS INVOLVING MULTIPLE JOINTS: ICD-10-CM

## 2019-07-29 DIAGNOSIS — R74.8 ELEVATED CK: ICD-10-CM

## 2019-07-29 DIAGNOSIS — E78.5 DYSLIPIDEMIA: ICD-10-CM

## 2019-07-29 DIAGNOSIS — I10 ESSENTIAL HYPERTENSION: ICD-10-CM

## 2019-07-29 DIAGNOSIS — R35.1 NOCTURIA: ICD-10-CM

## 2019-07-29 DIAGNOSIS — E11.65 TYPE 2 DIABETES MELLITUS WITH HYPERGLYCEMIA, WITHOUT LONG-TERM CURRENT USE OF INSULIN (HCC): Primary | ICD-10-CM

## 2019-07-29 DIAGNOSIS — E55.9 VITAMIN D DEFICIENCY: ICD-10-CM

## 2019-07-29 LAB
EXPIRATION DATE: ABNORMAL
HBA1C MFR BLD: 6.6 %
Lab: ABNORMAL

## 2019-07-29 PROCEDURE — 83036 HEMOGLOBIN GLYCOSYLATED A1C: CPT | Performed by: PHYSICIAN ASSISTANT

## 2019-07-29 PROCEDURE — 99214 OFFICE O/P EST MOD 30 MIN: CPT | Performed by: PHYSICIAN ASSISTANT

## 2019-07-29 RX ORDER — EMPAGLIFLOZIN 25 MG/1
TABLET, FILM COATED ORAL
COMMUNITY
Start: 2019-06-05 | End: 2019-07-30 | Stop reason: SDUPTHER

## 2019-07-30 DIAGNOSIS — E55.9 VITAMIN D DEFICIENCY: ICD-10-CM

## 2019-07-30 DIAGNOSIS — M25.50 ARTHRALGIA, UNSPECIFIED JOINT: ICD-10-CM

## 2019-07-30 DIAGNOSIS — I10 ESSENTIAL HYPERTENSION: ICD-10-CM

## 2019-07-30 RX ORDER — LOSARTAN POTASSIUM 100 MG/1
100 TABLET ORAL DAILY
Qty: 90 TABLET | Refills: 1 | Status: SHIPPED | OUTPATIENT
Start: 2019-07-30 | End: 2019-12-13 | Stop reason: SDUPTHER

## 2019-07-30 RX ORDER — MELOXICAM 15 MG/1
15 TABLET ORAL DAILY
Qty: 90 TABLET | Refills: 1 | Status: SHIPPED | OUTPATIENT
Start: 2019-07-30 | End: 2020-07-31 | Stop reason: SDUPTHER

## 2019-07-30 RX ORDER — HYDROCHLOROTHIAZIDE 12.5 MG/1
12.5 CAPSULE, GELATIN COATED ORAL EVERY MORNING
Qty: 90 CAPSULE | Refills: 1 | Status: SHIPPED | OUTPATIENT
Start: 2019-07-30 | End: 2019-12-13 | Stop reason: SDUPTHER

## 2019-07-30 RX ORDER — EMPAGLIFLOZIN 25 MG/1
25 TABLET, FILM COATED ORAL DAILY
Qty: 90 TABLET | Refills: 1 | Status: SHIPPED | OUTPATIENT
Start: 2019-07-30 | End: 2019-12-13 | Stop reason: SDUPTHER

## 2019-07-30 RX ORDER — ERGOCALCIFEROL 1.25 MG/1
50000 CAPSULE ORAL
Qty: 12 CAPSULE | Refills: 1 | Status: SHIPPED | OUTPATIENT
Start: 2019-07-30 | End: 2019-12-13 | Stop reason: SDUPTHER

## 2019-08-01 NOTE — PATIENT INSTRUCTIONS
58-year-old male who presents today in follow-up of diabetes, hypertension, hyperlipidemia, vitamin D deficiency, arthritis, possible BPH with urinary frequency versus AE to Invokana. A1c has been uncontrolled at 7.3% and is 7.4% previously. He was seen by me 3/2019 while his provider was on leave, however, he is establishing care with me today, as she is no longer in practice. He was previously drinking a beer and a few shots every other day for about 3 months.  He just stopped drinking about 4 months ago.  Avoidance of alcohol improved his A1c control. Patient with previous AE with medications. He reported intolerance to Jardiance and possible intolerance to Januvia (but reports he did ok with Janumet). He was previously on Invokana but required a change per insurance. He was restarted on Jardiance and has had no AE. He also had previous intolerance to Metformin twice diaily but restarted at twice daily in March and has had no AE. He was started on Trulicity 3/2019 and reports shaking and dizziness. He was referred to Endocrinology but did not schedule appt when called. I advised that he needs to be diligent about examination of his feet and lower extremities ensuring no infections, skin lesions, ulcerations. He needs to ensure proper diet, increased hydration, and avoidance of ETOH. He will need to be seen ASAP if he has any skin breakdown or concern for infection. A1C has improved today to 6.6%. We will continue current regimen for now. Follow up in 3 months for recheck A1C and re-evaluation. He is aware he will need to see Endocrinology if uncontrolled DM, as he has had multiple medication tolerance issues and reports he cannot take Insulin due to being a . He will need to see Optometry for dilated eye exam prior to next visit.      Blood pressure today is elevated and he has 1+ pitting edema.  He has been taking Mobic daily.  I discussed risks of NSAIDs, including cardiovascular and cerebrovascular  risks, renal and hepatic concerns, GI bleeding, elevated blood pressure and edema.  Would tried stopping Mobic but reports his pain was too severe. He had elevated CK with last labs and was advised to return to recheck CK (to ensure this was not causing pain). He did not return for recheck. We will recheck today. We can consider autoimmune testing at follow up. He apparently had a brother that  about a year ago with diabetes and renal failure on dialysis.  I have discussed my concerns with uncontrolled HTN, previously uncontrolled DM, Mobic, and HCTZ. He should continue Losartan 100 mg once daily, Nifedical XL 60 mg once daily, and for now will continue HCTZ 12.5 mg once daily. We will need to consider changing medication regimen. He will have fasting labs today.  Call if no results in 1 week.  Further recommendations pending results.     Patient also has urinary frequency with nocturia.  He has been taking an over-the-counter supplement. Patient has not had further workup of this. PSA today. We should consider changing regimen- consideration of stopping HCTZ and Jardiance and trial of Bydureon or Ozempic as well as compression stockings to help with edema. I advised today to avoid drinking a couple hours before bed but ensure proper hydration through the day. He should avoid ecessive caffeine and all artificial sweeteners as well as sugars and sweets. Consideration of OMAIRA versus urology referral pending results.     He did have some depression with some alcohol use after his brother's death.  I advised consideration of treatment for depression versus counseling at last visit.  He has avoided alcohol and will continue to abstain. He reports he is doing well today. We will continue to evaluate and consider in the future.

## 2019-08-06 LAB
25(OH)D3+25(OH)D2 SERPL-MCNC: 54 NG/ML (ref 30–100)
ALBUMIN SERPL-MCNC: 4.3 G/DL (ref 3.5–5.2)
ALBUMIN/GLOB SERPL: 1.5 G/DL
ALP SERPL-CCNC: 84 U/L (ref 39–117)
ALT SERPL-CCNC: 28 U/L (ref 1–41)
AST SERPL-CCNC: 17 U/L (ref 1–40)
BASOPHILS # BLD AUTO: 0.03 10*3/MM3 (ref 0–0.2)
BASOPHILS NFR BLD AUTO: 0.6 % (ref 0–1.5)
BILIRUB SERPL-MCNC: 0.3 MG/DL (ref 0.2–1.2)
BUN SERPL-MCNC: 12 MG/DL (ref 8–23)
BUN/CREAT SERPL: 13.5 (ref 7–25)
CALCIUM SERPL-MCNC: 9.4 MG/DL (ref 8.6–10.5)
CHLORIDE SERPL-SCNC: 103 MMOL/L (ref 98–107)
CHOLEST SERPL-MCNC: 187 MG/DL (ref 0–200)
CK SERPL-CCNC: 380 U/L (ref 20–200)
CO2 SERPL-SCNC: 26.3 MMOL/L (ref 22–29)
CREAT SERPL-MCNC: 0.89 MG/DL (ref 0.76–1.27)
EOSINOPHIL # BLD AUTO: 0.13 10*3/MM3 (ref 0–0.4)
EOSINOPHIL NFR BLD AUTO: 2.7 % (ref 0.3–6.2)
ERYTHROCYTE [DISTWIDTH] IN BLOOD BY AUTOMATED COUNT: 13.1 % (ref 12.3–15.4)
GLOBULIN SER CALC-MCNC: 2.8 GM/DL
GLUCOSE SERPL-MCNC: 156 MG/DL (ref 65–99)
HCT VFR BLD AUTO: 47.3 % (ref 37.5–51)
HDLC SERPL-MCNC: 48 MG/DL (ref 40–60)
HGB BLD-MCNC: 14.3 G/DL (ref 13–17.7)
IMM GRANULOCYTES # BLD AUTO: 0.01 10*3/MM3 (ref 0–0.05)
IMM GRANULOCYTES NFR BLD AUTO: 0.2 % (ref 0–0.5)
LDLC SERPL CALC-MCNC: 115 MG/DL (ref 0–100)
LDLC/HDLC SERPL: 2.39 {RATIO}
LYMPHOCYTES # BLD AUTO: 1.24 10*3/MM3 (ref 0.7–3.1)
LYMPHOCYTES NFR BLD AUTO: 25.4 % (ref 19.6–45.3)
MCH RBC QN AUTO: 30.8 PG (ref 26.6–33)
MCHC RBC AUTO-ENTMCNC: 30.2 G/DL (ref 31.5–35.7)
MCV RBC AUTO: 101.7 FL (ref 79–97)
MONOCYTES # BLD AUTO: 0.5 10*3/MM3 (ref 0.1–0.9)
MONOCYTES NFR BLD AUTO: 10.2 % (ref 5–12)
NEUTROPHILS # BLD AUTO: 2.97 10*3/MM3 (ref 1.7–7)
NEUTROPHILS NFR BLD AUTO: 60.9 % (ref 42.7–76)
NRBC BLD AUTO-RTO: 0 /100 WBC (ref 0–0.2)
PLATELET # BLD AUTO: 279 10*3/MM3 (ref 140–450)
POTASSIUM SERPL-SCNC: 4.6 MMOL/L (ref 3.5–5.2)
PROT SERPL-MCNC: 7.1 G/DL (ref 6–8.5)
PSA SERPL-MCNC: 0.39 NG/ML (ref 0–4)
RBC # BLD AUTO: 4.65 10*6/MM3 (ref 4.14–5.8)
SODIUM SERPL-SCNC: 142 MMOL/L (ref 136–145)
TRIGL SERPL-MCNC: 122 MG/DL (ref 0–150)
VLDLC SERPL CALC-MCNC: 24.4 MG/DL
WBC # BLD AUTO: 4.88 10*3/MM3 (ref 3.4–10.8)

## 2019-08-15 RX ORDER — ATORVASTATIN CALCIUM 20 MG/1
20 TABLET, FILM COATED ORAL NIGHTLY
Qty: 30 TABLET | Refills: 2 | Status: SHIPPED | OUTPATIENT
Start: 2019-08-15 | End: 2019-11-19 | Stop reason: SDUPTHER

## 2019-11-18 DIAGNOSIS — I10 ESSENTIAL HYPERTENSION: ICD-10-CM

## 2019-11-18 RX ORDER — NIFEDIPINE 60 MG/1
60 TABLET, EXTENDED RELEASE ORAL DAILY
Qty: 90 TABLET | Refills: 0 | Status: SHIPPED | OUTPATIENT
Start: 2019-11-18 | End: 2019-12-13 | Stop reason: SDUPTHER

## 2019-11-18 RX ORDER — NIFEDIPINE 60 MG/1
60 TABLET, EXTENDED RELEASE ORAL DAILY
Qty: 90 TABLET | Refills: 0 | Status: SHIPPED | OUTPATIENT
Start: 2019-11-18 | End: 2019-11-18 | Stop reason: SDUPTHER

## 2019-11-19 RX ORDER — ATORVASTATIN CALCIUM 20 MG/1
20 TABLET, FILM COATED ORAL NIGHTLY
Qty: 30 TABLET | Refills: 0 | Status: SHIPPED | OUTPATIENT
Start: 2019-11-19 | End: 2019-12-13 | Stop reason: SDUPTHER

## 2019-12-12 ENCOUNTER — OFFICE VISIT (OUTPATIENT)
Dept: FAMILY MEDICINE CLINIC | Facility: CLINIC | Age: 59
End: 2019-12-12

## 2019-12-12 VITALS
RESPIRATION RATE: 16 BRPM | WEIGHT: 315 LBS | HEART RATE: 95 BPM | OXYGEN SATURATION: 97 % | BODY MASS INDEX: 44.1 KG/M2 | HEIGHT: 71 IN | DIASTOLIC BLOOD PRESSURE: 70 MMHG | SYSTOLIC BLOOD PRESSURE: 148 MMHG | TEMPERATURE: 98.5 F

## 2019-12-12 DIAGNOSIS — E55.9 VITAMIN D DEFICIENCY: ICD-10-CM

## 2019-12-12 DIAGNOSIS — R74.8 ELEVATED CK: ICD-10-CM

## 2019-12-12 DIAGNOSIS — D64.9 ANEMIA, UNSPECIFIED TYPE: ICD-10-CM

## 2019-12-12 DIAGNOSIS — E78.5 DYSLIPIDEMIA: ICD-10-CM

## 2019-12-12 DIAGNOSIS — M25.50 ARTHRALGIA, UNSPECIFIED JOINT: ICD-10-CM

## 2019-12-12 DIAGNOSIS — M15.9 PRIMARY OSTEOARTHRITIS INVOLVING MULTIPLE JOINTS: ICD-10-CM

## 2019-12-12 DIAGNOSIS — E11.65 TYPE 2 DIABETES MELLITUS WITH HYPERGLYCEMIA, WITHOUT LONG-TERM CURRENT USE OF INSULIN (HCC): Primary | ICD-10-CM

## 2019-12-12 LAB
EXPIRATION DATE: ABNORMAL
HBA1C MFR BLD: 7.1 %
Lab: ABNORMAL
POC ACETONE URINE: NORMAL
POC CREATININE URINE: NORMAL
POC MICROALBUMIN URINE: NORMAL

## 2019-12-12 PROCEDURE — 83036 HEMOGLOBIN GLYCOSYLATED A1C: CPT | Performed by: PHYSICIAN ASSISTANT

## 2019-12-12 PROCEDURE — 99214 OFFICE O/P EST MOD 30 MIN: CPT | Performed by: PHYSICIAN ASSISTANT

## 2019-12-12 PROCEDURE — 82044 UR ALBUMIN SEMIQUANTITATIVE: CPT | Performed by: PHYSICIAN ASSISTANT

## 2019-12-12 NOTE — PROGRESS NOTES
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      History of Present Illness     States his company was bought out and he does not think he passed the CPE for the new company. He will find out soon if he passed and may lose his job. States he has also lost a couple friends die.        Diabetes- Has been drinking more alcohol due to increased stress of possibly losing his job and the death of a couple friends. Went to [facility] in [employer] about 1 month ago. They were to fax report. No DMR but had to change prescription.  Hypertension- not checking BP at home.   Hyperlipidemia- not working on diet or exercise.   Vitamin D- taking 50,000IU once weekly.   Anemia- not sure about etiology of anemia. Not on supplement    Joint pain- States diclofenac gel from his sister helped his foot pain.  Had to stop NSAIDS.     ED- Would like Viagra refilled. Has not had cardiac clearance for the medication. [facility] was giving samples then prescribing Viagra. Has not seen cardiology at all in the past. States he will lose insurance tomorrow. Reports he has no idea what will happen.    The following portions of the patient's history were reviewed and updated as appropriate: allergies, current medications, past family history, past medical history, past social history, past surgical history and problem list.    Review of Systems   Constitutional: Negative.    HENT: Negative.    Eyes: Negative.    Respiratory: Negative.    Cardiovascular: Negative.    Gastrointestinal: Negative.    Genitourinary:        ED   Musculoskeletal: Positive for arthralgias and back pain.   Skin: Negative.    Neurological: Negative.    Hematological: Negative.    Psychiatric/Behavioral: Negative.    All other systems reviewed and are negative.      Objective    Vitals:    12/12/19 1226   BP: 148/70   Pulse: 95   Resp: 16   Temp: 98.5 °F (36.9 °C)    SpO2: 97%     Body mass index is 49.37 kg/m².    Physical Exam   Constitutional: He is oriented to person, place, and time. He appears well-developed.   HENT:   Head: Normocephalic and atraumatic.   Right Ear: External ear normal.   Left Ear: External ear normal.   Eyes: Conjunctivae are normal.   Neck: Carotid bruit is not present. No tracheal deviation present. No thyroid mass and no thyromegaly present.   Cardiovascular: Normal rate, regular rhythm, normal heart sounds and intact distal pulses.   Pulmonary/Chest: Effort normal and breath sounds normal.   Neurological: He is alert and oriented to person, place, and time. Gait normal.   Skin: Skin is warm and dry.   Psychiatric: He has a normal mood and affect. His behavior is normal. Judgment and thought content normal.   Nursing note and vitals reviewed.      Assessment/Plan   Pastor was seen today for med management.    Diagnoses and all orders for this visit:    Type 2 diabetes mellitus with hyperglycemia, without long-term current use of insulin (CMS/[facility])  -     POCT glycated hemoglobin, total  -     POCT microalbumin  -     Comprehensive Metabolic Panel  -     TSH    Dyslipidemia  -     Comprehensive Metabolic Panel  -     CK  -     Lipid Panel With LDL / HDL Ratio  -     TSH    Elevated CK  -     Comprehensive Metabolic Panel  -     TSH    Vitamin D deficiency  -     Comprehensive Metabolic Panel  -     Vitamin D 25 Hydroxy  -     TSH    Anemia, unspecified type  -     CBC & Differential  -     Iron and TIBC  -     Ferritin  -     Vitamin B12 & Folate  -     TSH    Primary osteoarthritis involving multiple joints  -     TSH    Arthralgia, unspecified joint  -     TSH      Patient Instructions   Assessment and Plan  59-year-old male who presents today in follow-up of diabetes mellitus, hypertension, hyperlipidemia, vitamin D deficiency, arthritis, possible BPH with urinary frequency, and ED. A1c has been uncontrolled at 7.3 - 7.4% previously. At last visit,  A1C improved to 6.6%, however, he started drinking more heavily again with stressors with his job and the death of several friends, and A1C is up to 7.1% today. He reported intolerance to Jardiance and possible intolerance to Januvia (but reports he did ok with Janumet). He was previously on Invokana but required a change per insurance. He was restarted on Jardiance and has had no AE. He also had previous intolerance to Metformin twice daily but restarted at twice daily in March and has had no AE. He was started on Trulicity 3/2019 and reports shaking and dizziness. He was referred to Endocrinology but did not schedule appt when called. I advised that he needs to be diligent about examination of his feet and lower extremities ensuring no infections, skin lesions, ulcerations. He needs to ensure proper diet, increased hydration, and avoidance of ETOH. He will need to be seen ASAP if he has any skin breakdown or concern for infection. We will continue current regimen for now but he needs to be compliant with diet, exercise, medications, and abstain from alcohol. Follow up in 3 months for recheck A1C and re-evaluation. He is aware he will need to see Endocrinology if uncontrolled DM, as he has had multiple medication tolerance issues and reports he cannot take Insulin due to being a , per patient.      Blood pressure today is elevated and he has 1+ pitting edema.  He has taken Mobic daily in the past.  I discussed risks of NSAIDs, including cardiovascular and cerebrovascular risks, renal and hepatic concerns, GI bleeding, elevated blood pressure and edema.  Would tried stopping Mobic but reports his pain was too severe. He apparently had a brother that  about a year ago with diabetes and renal failure on dialysis.  I have discussed my concerns with uncontrolled HTN, previously uncontrolled DM, Mobic, and HCTZ. He should continue Losartan 100 mg once daily, Nifedical XL 60 mg once daily, and for now will  continue HCTZ 12.5 mg once daily. We will need to consider changing medication regimen. He continued taking Lipitor 20 mg at bedtime and Omega 3 1000 mg. I will await labs. He is taking vitamin D 50,000IU once weekly. Patient will have fasting labs. Call if no results in 1 week. Stability of conditions, plan, follow up, and further recommendations pending labs.     Patient also has urinary frequency with nocturia.  He has been taking an over-the-counter supplement. Patient has not had further workup of this. I advised last visit that we should consider changing regimen- consideration of stopping HCTZ and Jardiance and trial of Bydureon or Ozempic as well as compression stockings to help with edema. I advised today him to avoid drinking a couple hours before bed but ensure proper hydration through the day. He should avoid excessive caffeine and all EtOH and artificial sweeteners as well as sugars and sweets. He inquired about Viagra Rx today. He has not had formal workup or cardiac clearance for medication. I advised that he may need to see Urology for evaluation of urinary symptoms and prostate/ ED symptoms.      He did have some depression with some alcohol use after his brother's death.  I advised consideration of treatment for depression versus counseling at last visit. He declined treatment currently. He should consider this and follow up if persistent symptoms.

## 2019-12-13 ENCOUNTER — TELEPHONE (OUTPATIENT)
Dept: FAMILY MEDICINE CLINIC | Facility: CLINIC | Age: 59
End: 2019-12-13

## 2019-12-13 DIAGNOSIS — E55.9 VITAMIN D DEFICIENCY: ICD-10-CM

## 2019-12-13 DIAGNOSIS — I10 ESSENTIAL HYPERTENSION: ICD-10-CM

## 2019-12-13 LAB
25(OH)D3+25(OH)D2 SERPL-MCNC: 54.8 NG/ML (ref 30–100)
ALBUMIN SERPL-MCNC: 4.6 G/DL (ref 3.6–4.8)
ALBUMIN/GLOB SERPL: 1.8 {RATIO} (ref 1.2–2.2)
ALP SERPL-CCNC: 88 IU/L (ref 39–117)
ALT SERPL-CCNC: 27 IU/L (ref 0–44)
AST SERPL-CCNC: 14 IU/L (ref 0–40)
BASOPHILS # BLD AUTO: 0 X10E3/UL (ref 0–0.2)
BASOPHILS NFR BLD AUTO: 1 %
BILIRUB SERPL-MCNC: 0.4 MG/DL (ref 0–1.2)
BUN SERPL-MCNC: 14 MG/DL (ref 8–27)
BUN/CREAT SERPL: 16 (ref 10–24)
CALCIUM SERPL-MCNC: 9.7 MG/DL (ref 8.6–10.2)
CHLORIDE SERPL-SCNC: 101 MMOL/L (ref 96–106)
CHOLEST SERPL-MCNC: 137 MG/DL (ref 100–199)
CK SERPL-CCNC: 189 U/L (ref 24–204)
CO2 SERPL-SCNC: 23 MMOL/L (ref 20–29)
CREAT SERPL-MCNC: 0.85 MG/DL (ref 0.76–1.27)
EOSINOPHIL # BLD AUTO: 0.2 X10E3/UL (ref 0–0.4)
EOSINOPHIL NFR BLD AUTO: 4 %
ERYTHROCYTE [DISTWIDTH] IN BLOOD BY AUTOMATED COUNT: 13.4 % (ref 12.3–15.4)
FERRITIN SERPL-MCNC: 257 NG/ML (ref 30–400)
FOLATE SERPL-MCNC: >20 NG/ML
GLOBULIN SER CALC-MCNC: 2.6 G/DL (ref 1.5–4.5)
GLUCOSE SERPL-MCNC: 114 MG/DL (ref 65–99)
HCT VFR BLD AUTO: 41.4 % (ref 37.5–51)
HDLC SERPL-MCNC: 48 MG/DL
HGB BLD-MCNC: 13.7 G/DL (ref 13–17.7)
IMM GRANULOCYTES # BLD AUTO: 0 X10E3/UL (ref 0–0.1)
IMM GRANULOCYTES NFR BLD AUTO: 0 %
IRON SATN MFR SERPL: 16 % (ref 15–55)
IRON SERPL-MCNC: 51 UG/DL (ref 38–169)
LDLC SERPL CALC-MCNC: 66 MG/DL (ref 0–99)
LDLC/HDLC SERPL: 1.4 RATIO (ref 0–3.6)
LYMPHOCYTES # BLD AUTO: 1.8 X10E3/UL (ref 0.7–3.1)
LYMPHOCYTES NFR BLD AUTO: 28 %
MCH RBC QN AUTO: 30.9 PG (ref 26.6–33)
MCHC RBC AUTO-ENTMCNC: 33.1 G/DL (ref 31.5–35.7)
MCV RBC AUTO: 94 FL (ref 79–97)
MONOCYTES # BLD AUTO: 0.6 X10E3/UL (ref 0.1–0.9)
MONOCYTES NFR BLD AUTO: 9 %
NEUTROPHILS # BLD AUTO: 3.7 X10E3/UL (ref 1.4–7)
NEUTROPHILS NFR BLD AUTO: 58 %
PLATELET # BLD AUTO: 280 X10E3/UL (ref 150–450)
POTASSIUM SERPL-SCNC: 4.1 MMOL/L (ref 3.5–5.2)
PROT SERPL-MCNC: 7.2 G/DL (ref 6–8.5)
RBC # BLD AUTO: 4.43 X10E6/UL (ref 4.14–5.8)
SODIUM SERPL-SCNC: 142 MMOL/L (ref 134–144)
TIBC SERPL-MCNC: 310 UG/DL (ref 250–450)
TRIGL SERPL-MCNC: 114 MG/DL (ref 0–149)
TSH SERPL DL<=0.005 MIU/L-ACNC: 1.55 UIU/ML (ref 0.45–4.5)
UIBC SERPL-MCNC: 259 UG/DL (ref 111–343)
VIT B12 SERPL-MCNC: 554 PG/ML (ref 232–1245)
VLDLC SERPL CALC-MCNC: 23 MG/DL (ref 5–40)
WBC # BLD AUTO: 6.3 X10E3/UL (ref 3.4–10.8)

## 2019-12-13 RX ORDER — HYDROCHLOROTHIAZIDE 12.5 MG/1
12.5 CAPSULE, GELATIN COATED ORAL EVERY MORNING
Qty: 90 CAPSULE | Refills: 1 | Status: SHIPPED | OUTPATIENT
Start: 2019-12-13 | End: 2020-03-16 | Stop reason: SDUPTHER

## 2019-12-13 RX ORDER — EMPAGLIFLOZIN 25 MG/1
25 TABLET, FILM COATED ORAL DAILY
Qty: 90 TABLET | Refills: 1 | Status: SHIPPED | OUTPATIENT
Start: 2019-12-13 | End: 2020-03-16 | Stop reason: SDUPTHER

## 2019-12-13 RX ORDER — ATORVASTATIN CALCIUM 20 MG/1
20 TABLET, FILM COATED ORAL NIGHTLY
Qty: 90 TABLET | Refills: 0 | Status: SHIPPED | OUTPATIENT
Start: 2019-12-13 | End: 2020-03-16 | Stop reason: SDUPTHER

## 2019-12-13 RX ORDER — NIFEDIPINE 60 MG/1
60 TABLET, EXTENDED RELEASE ORAL DAILY
Qty: 90 TABLET | Refills: 0 | Status: SHIPPED | OUTPATIENT
Start: 2019-12-13 | End: 2020-03-16 | Stop reason: SDUPTHER

## 2019-12-13 RX ORDER — LOSARTAN POTASSIUM 100 MG/1
100 TABLET ORAL DAILY
Qty: 90 TABLET | Refills: 1 | Status: SHIPPED | OUTPATIENT
Start: 2019-12-13 | End: 2020-03-16 | Stop reason: SDUPTHER

## 2019-12-13 RX ORDER — ERGOCALCIFEROL 1.25 MG/1
50000 CAPSULE ORAL
Qty: 12 CAPSULE | Refills: 1 | Status: SHIPPED | OUTPATIENT
Start: 2019-12-13 | End: 2020-03-16 | Stop reason: SDUPTHER

## 2019-12-13 NOTE — TELEPHONE ENCOUNTER
Patient called states he was suppose to get a refill on his cholesterol medication and sildenafil both for a 90 day supply that he is losing his insurance after this week

## 2019-12-13 NOTE — TELEPHONE ENCOUNTER
He will need to see cardiology if he is going to need sildenafil.  I will refill all other medications for 90 days

## 2019-12-23 NOTE — PATIENT INSTRUCTIONS
Assessment and Plan  59-year-old male who presents today in follow-up of diabetes mellitus, hypertension, hyperlipidemia, vitamin D deficiency, arthritis, possible BPH with urinary frequency, and ED. A1c has been uncontrolled at 7.3 - 7.4% previously. At last visit, A1C improved to 6.6%, however, he started drinking more heavily again with stressors with his job and the death of several friends, and A1C is up to 7.1% today. He reported intolerance to Jardiance and possible intolerance to Januvia (but reports he did ok with Janumet). He was previously on Invokana but required a change per insurance. He was restarted on Jardiance and has had no AE. He also had previous intolerance to Metformin twice daily but restarted at twice daily in March and has had no AE. He was started on Trulicity 3/2019 and reports shaking and dizziness. He was referred to Endocrinology but did not schedule appt when called. I advised that he needs to be diligent about examination of his feet and lower extremities ensuring no infections, skin lesions, ulcerations. He needs to ensure proper diet, increased hydration, and avoidance of ETOH. He will need to be seen ASAP if he has any skin breakdown or concern for infection. We will continue current regimen for now but he needs to be compliant with diet, exercise, medications, and abstain from alcohol. Follow up in 3 months for recheck A1C and re-evaluation. He is aware he will need to see Endocrinology if uncontrolled DM, as he has had multiple medication tolerance issues and reports he cannot take Insulin due to being a , per patient.      Blood pressure today is elevated and he has 1+ pitting edema.  He has taken Mobic daily in the past.  I discussed risks of NSAIDs, including cardiovascular and cerebrovascular risks, renal and hepatic concerns, GI bleeding, elevated blood pressure and edema.  Would tried stopping Mobic but reports his pain was too severe. He apparently had a  brother that  about a year ago with diabetes and renal failure on dialysis.  I have discussed my concerns with uncontrolled HTN, previously uncontrolled DM, Mobic, and HCTZ. He should continue Losartan 100 mg once daily, Nifedical XL 60 mg once daily, and for now will continue HCTZ 12.5 mg once daily. We will need to consider changing medication regimen. He continued taking Lipitor 20 mg at bedtime and Omega 3 1000 mg. I will await labs. He is taking vitamin D 50,000IU once weekly. Patient will have fasting labs. Call if no results in 1 week. Stability of conditions, plan, follow up, and further recommendations pending labs.     Patient also has urinary frequency with nocturia.  He has been taking an over-the-counter supplement. Patient has not had further workup of this. I advised last visit that we should consider changing regimen- consideration of stopping HCTZ and Jardiance and trial of Bydureon or Ozempic as well as compression stockings to help with edema. I advised today him to avoid drinking a couple hours before bed but ensure proper hydration through the day. He should avoid excessive caffeine and all EtOH and artificial sweeteners as well as sugars and sweets. He inquired about Viagra Rx today. He has not had formal workup or cardiac clearance for medication. I advised that he may need to see Urology for evaluation of urinary symptoms and prostate/ ED symptoms.      He did have some depression with some alcohol use after his brother's death.  I advised consideration of treatment for depression versus counseling at last visit. He declined treatment currently. He should consider this and follow up if persistent symptoms.

## 2020-03-16 ENCOUNTER — OFFICE VISIT (OUTPATIENT)
Dept: FAMILY MEDICINE CLINIC | Facility: CLINIC | Age: 60
End: 2020-03-16

## 2020-03-16 VITALS
DIASTOLIC BLOOD PRESSURE: 80 MMHG | WEIGHT: 315 LBS | BODY MASS INDEX: 44.1 KG/M2 | HEART RATE: 86 BPM | SYSTOLIC BLOOD PRESSURE: 140 MMHG | HEIGHT: 71 IN | OXYGEN SATURATION: 93 % | TEMPERATURE: 98.6 F

## 2020-03-16 DIAGNOSIS — E55.9 VITAMIN D DEFICIENCY: ICD-10-CM

## 2020-03-16 DIAGNOSIS — J06.9 ACUTE URI: Primary | ICD-10-CM

## 2020-03-16 DIAGNOSIS — J40 BRONCHITIS: ICD-10-CM

## 2020-03-16 DIAGNOSIS — I10 ESSENTIAL HYPERTENSION: ICD-10-CM

## 2020-03-16 PROCEDURE — 99213 OFFICE O/P EST LOW 20 MIN: CPT | Performed by: PHYSICIAN ASSISTANT

## 2020-03-16 RX ORDER — HYDROCHLOROTHIAZIDE 12.5 MG/1
12.5 CAPSULE, GELATIN COATED ORAL EVERY MORNING
Qty: 90 CAPSULE | Refills: 1 | Status: SHIPPED | OUTPATIENT
Start: 2020-03-16 | End: 2020-06-19 | Stop reason: SDUPTHER

## 2020-03-16 RX ORDER — ERGOCALCIFEROL 1.25 MG/1
50000 CAPSULE ORAL
Qty: 12 CAPSULE | Refills: 1 | Status: SHIPPED | OUTPATIENT
Start: 2020-03-16 | End: 2020-06-19 | Stop reason: SDUPTHER

## 2020-03-16 RX ORDER — CEPHALEXIN 500 MG/1
500 CAPSULE ORAL 4 TIMES DAILY
Qty: 40 CAPSULE | Refills: 0 | Status: SHIPPED | OUTPATIENT
Start: 2020-03-16 | End: 2020-06-19

## 2020-03-16 RX ORDER — FLUTICASONE PROPIONATE 50 MCG
2 SPRAY, SUSPENSION (ML) NASAL DAILY
Qty: 1 BOTTLE | Refills: 0 | Status: SHIPPED | OUTPATIENT
Start: 2020-03-16 | End: 2020-04-15

## 2020-03-16 RX ORDER — ATORVASTATIN CALCIUM 20 MG/1
20 TABLET, FILM COATED ORAL NIGHTLY
Qty: 90 TABLET | Refills: 0 | Status: SHIPPED | OUTPATIENT
Start: 2020-03-16 | End: 2020-06-19 | Stop reason: SDUPTHER

## 2020-03-16 RX ORDER — NIFEDIPINE 60 MG/1
60 TABLET, EXTENDED RELEASE ORAL DAILY
Qty: 90 TABLET | Refills: 0 | Status: SHIPPED | OUTPATIENT
Start: 2020-03-16 | End: 2020-06-19 | Stop reason: SDUPTHER

## 2020-03-16 RX ORDER — LOSARTAN POTASSIUM 100 MG/1
100 TABLET ORAL DAILY
Qty: 90 TABLET | Refills: 1 | Status: SHIPPED | OUTPATIENT
Start: 2020-03-16 | End: 2020-06-19 | Stop reason: SDUPTHER

## 2020-03-16 RX ORDER — FLUTICASONE PROPIONATE 50 MCG
2 SPRAY, SUSPENSION (ML) NASAL DAILY
Qty: 1 BOTTLE | Refills: 0 | Status: SHIPPED | OUTPATIENT
Start: 2020-03-16 | End: 2020-03-16 | Stop reason: SDUPTHER

## 2020-03-16 RX ORDER — BUDESONIDE AND FORMOTEROL FUMARATE DIHYDRATE 160; 4.5 UG/1; UG/1
AEROSOL RESPIRATORY (INHALATION)
Qty: 1 INHALER | Refills: 0 | COMMUNITY
Start: 2020-03-16

## 2020-03-16 RX ORDER — ALBUTEROL SULFATE 90 UG/1
2 AEROSOL, METERED RESPIRATORY (INHALATION) EVERY 4 HOURS PRN
Qty: 1 INHALER | Refills: 0 | Status: SHIPPED | OUTPATIENT
Start: 2020-03-16 | End: 2020-03-16 | Stop reason: SDUPTHER

## 2020-03-16 RX ORDER — GUAIFENESIN AND DEXTROMETHORPHAN HYDROBROMIDE 600; 30 MG/1; MG/1
1 TABLET, EXTENDED RELEASE ORAL 2 TIMES DAILY
Qty: 45 TABLET | Refills: 0 | Status: SHIPPED | OUTPATIENT
Start: 2020-03-16 | End: 2020-03-16 | Stop reason: SDUPTHER

## 2020-03-16 RX ORDER — ALBUTEROL SULFATE 90 UG/1
2 AEROSOL, METERED RESPIRATORY (INHALATION) EVERY 4 HOURS PRN
Qty: 1 INHALER | Refills: 0 | Status: SHIPPED | OUTPATIENT
Start: 2020-03-16

## 2020-03-16 RX ORDER — GUAIFENESIN AND DEXTROMETHORPHAN HYDROBROMIDE 600; 30 MG/1; MG/1
1 TABLET, EXTENDED RELEASE ORAL 2 TIMES DAILY
Qty: 45 TABLET | Refills: 0 | Status: SHIPPED | OUTPATIENT
Start: 2020-03-16 | End: 2020-06-19

## 2020-03-16 RX ORDER — CEPHALEXIN 500 MG/1
500 CAPSULE ORAL 4 TIMES DAILY
Qty: 40 CAPSULE | Refills: 0 | Status: SHIPPED | OUTPATIENT
Start: 2020-03-16 | End: 2020-03-16 | Stop reason: SDUPTHER

## 2020-03-16 RX ORDER — EMPAGLIFLOZIN 25 MG/1
25 TABLET, FILM COATED ORAL DAILY
Qty: 90 TABLET | Refills: 1 | Status: SHIPPED | OUTPATIENT
Start: 2020-03-16 | End: 2020-06-19

## 2020-03-16 NOTE — PROGRESS NOTES
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 Presents today to be evaluated for cough/congestion, fever, and wheezing.     History of Present Illness     Reports started 3 weeks ago with fever, dizziness, coughing, and got Mucinex liquid that helped. Fever has been intermittent- subjective mild fever. Taking aspirin for it. Coughs for a minute or 2 then resolves. Has increased PND - green that is worse overnight into the morning. Patient reports he has wheezing at night.     The following portions of the patient's history were reviewed and updated as appropriate: allergies, current medications, past family history, past medical history, past social history, past surgical history and problem list.    Review of Systems   Constitutional: Positive for fever.   HENT: Positive for congestion and postnasal drip.    Respiratory: Positive for cough and wheezing.    Gastrointestinal: Negative.    Genitourinary: Negative.    Musculoskeletal: Positive for arthralgias.   Neurological: Positive for dizziness.   Psychiatric/Behavioral: Negative.        Objective   Vitals:    03/16/20 1408   BP: 140/80   Pulse: 86   Temp: 98.6 °F (37 °C)   SpO2: 93%     Body mass index is 49.26 kg/m².    Physical Exam   Constitutional: He is oriented to person, place, and time. Vital signs are normal. He appears well-developed and well-nourished.   HENT:   Head: Normocephalic and atraumatic.   Right Ear: Tympanic membrane, external ear and ear canal normal.   Left Ear: Tympanic membrane, external ear and ear canal normal.   Nose: Nose normal.   Mouth/Throat: Uvula is midline, oropharynx is clear and moist and mucous membranes are normal.   Eyes: Conjunctivae are normal.   Neck: Neck supple.   Cardiovascular: Normal rate, regular rhythm and normal heart sounds. Exam reveals no gallop and no friction rub.   No murmur heard.  Pulmonary/Chest: Effort normal and breath sounds normal. He has no wheezes. He has no rhonchi. He has no rales.    Neurological: He is alert and oriented to person, place, and time.   Skin: Skin is warm and dry.   Psychiatric: He has a normal mood and affect. His speech is normal and behavior is normal. Judgment and thought content normal. Cognition and memory are normal.   Nursing note and vitals reviewed.      Assessment/Plan   Pastor was seen today for fever, cough, nasal congestion and wheezing.    Diagnoses and all orders for this visit:    Acute URI  -     Discontinue: cephalexin (KEFLEX) 500 MG capsule; Take 1 capsule by mouth 4 (Four) Times a Day.  -     Discontinue: guaifenesin-dextromethorphan (MUCINEX DM)  MG tablet sustained-release 12 hour tablet; Take 1 tablet by mouth 2 (Two) Times a Day.  -     Discontinue: fluticasone (FLONASE) 50 MCG/ACT nasal spray; 2 sprays into the nostril(s) as directed by provider Daily for 30 days. Administer 2 sprays in each nostril for each dose.  -     Discontinue: Fluticasone Furoate-Vilanterol (BREO ELLIPTA) 100-25 MCG/INH inhaler; 1 PUFF ONCE DAILY THEN RINSE MOUTH  -     Discontinue: albuterol sulfate  (90 Base) MCG/ACT inhaler; Inhale 2 puffs Every 4 (Four) Hours As Needed for Wheezing or Shortness of Air.  -     Discontinue: Fluticasone Furoate-Vilanterol (BREO ELLIPTA) 100-25 MCG/INH inhaler; 1 PUFF ONCE DAILY THEN RINSE MOUTH  -     guaifenesin-dextromethorphan (MUCINEX DM)  MG tablet sustained-release 12 hour tablet; Take 1 tablet by mouth 2 (Two) Times a Day.  -     fluticasone (FLONASE) 50 MCG/ACT nasal spray; 2 sprays into the nostril(s) as directed by provider Daily for 30 days. Administer 2 sprays in each nostril for each dose.  -     albuterol sulfate  (90 Base) MCG/ACT inhaler; Inhale 2 puffs Every 4 (Four) Hours As Needed for Wheezing or Shortness of Air.  -     cephalexin (KEFLEX) 500 MG capsule; Take 1 capsule by mouth 4 (Four) Times a Day.  -     budesonide-formoterol (SYMBICORT) 160-4.5 MCG/ACT inhaler; 2 PUFFS TWICE DAILY THEN RINSE  MOUTH    Bronchitis  -     Discontinue: cephalexin (KEFLEX) 500 MG capsule; Take 1 capsule by mouth 4 (Four) Times a Day.  -     Discontinue: guaifenesin-dextromethorphan (MUCINEX DM)  MG tablet sustained-release 12 hour tablet; Take 1 tablet by mouth 2 (Two) Times a Day.  -     Discontinue: fluticasone (FLONASE) 50 MCG/ACT nasal spray; 2 sprays into the nostril(s) as directed by provider Daily for 30 days. Administer 2 sprays in each nostril for each dose.  -     Discontinue: Fluticasone Furoate-Vilanterol (BREO ELLIPTA) 100-25 MCG/INH inhaler; 1 PUFF ONCE DAILY THEN RINSE MOUTH  -     Discontinue: albuterol sulfate  (90 Base) MCG/ACT inhaler; Inhale 2 puffs Every 4 (Four) Hours As Needed for Wheezing or Shortness of Air.  -     Discontinue: Fluticasone Furoate-Vilanterol (BREO ELLIPTA) 100-25 MCG/INH inhaler; 1 PUFF ONCE DAILY THEN RINSE MOUTH  -     guaifenesin-dextromethorphan (MUCINEX DM)  MG tablet sustained-release 12 hour tablet; Take 1 tablet by mouth 2 (Two) Times a Day.  -     fluticasone (FLONASE) 50 MCG/ACT nasal spray; 2 sprays into the nostril(s) as directed by provider Daily for 30 days. Administer 2 sprays in each nostril for each dose.  -     albuterol sulfate  (90 Base) MCG/ACT inhaler; Inhale 2 puffs Every 4 (Four) Hours As Needed for Wheezing or Shortness of Air.  -     cephalexin (KEFLEX) 500 MG capsule; Take 1 capsule by mouth 4 (Four) Times a Day.  -     budesonide-formoterol (SYMBICORT) 160-4.5 MCG/ACT inhaler; 2 PUFFS TWICE DAILY THEN RINSE MOUTH    Essential hypertension  -     losartan (COZAAR) 100 MG tablet; Take 1 tablet by mouth Daily.  -     hydroCHLOROthiazide (MICROZIDE) 12.5 MG capsule; Take 1 capsule by mouth Every Morning.  -     NIFEdipine XL (NIFEDICAL XL) 60 MG 24 hr tablet; Take 1 tablet by mouth Daily.    Vitamin D deficiency  -     vitamin D (ERGOCALCIFEROL) 1.25 MG (71514 UT) capsule capsule; Take 1 capsule by mouth Every 7 (Seven) Days.    Other  orders  -     metFORMIN (GLUCOPHAGE) 1000 MG tablet; Take 1 tablet by mouth 2 (Two) Times a Day With Meals.  -     atorvastatin (LIPITOR) 20 MG tablet; Take 1 tablet by mouth Every Night.  -     JARDIANCE 25 MG tablet; Take 25 mg by mouth Daily.        Patient Instructions   Assessment and plan  67-year-old male who presents today with fever, dizziness, and coughing for about 3 weeks.  He got Mucinex liquid that helped some. Fever has been subjective, mild, and intermittent. Cough has been intermittent and lasts a minute or 2 then resolves.  He has also had increased PND that is green and worse overnight into the morning. Patient reports he has wheezing at night.  Benign exam today.  Patient is at increased risk due to uncontrolled diabetes.  I will have him use Symbicort 160 mcg twice daily then rinse mouth, albuterol as needed, Mucinex DM twice daily, Claritin 10 mg once daily, and Flonase 2 sprays each nostril once daily.  I will also cover with Keflex 500 mg 4 times daily for 10 days.  He should be seen ASAP if worsening, new or changing symptoms.  I discussed with him that he could have a viral illness that we will treat for possible secondary infection due to length of illness and risks.  I did recommend self-isolation until symptoms resolve and to be seen here or ER ASAP if worsening, new or changing symptoms.    Of note, patient will not have insurance until last month and needs follow-up for diabetes, hypertension, lipids, and routine follow-up.  He will schedule this in 4 to 6 weeks with fasting labs.

## 2020-03-16 NOTE — PATIENT INSTRUCTIONS
Assessment and plan  67-year-old male who presents today with fever, dizziness, and coughing for about 3 weeks.  He got Mucinex liquid that helped some. Fever has been subjective, mild, and intermittent. Cough has been intermittent and lasts a minute or 2 then resolves.  He has also had increased PND that is green and worse overnight into the morning. Patient reports he has wheezing at night.  Benign exam today.  Patient is at increased risk due to uncontrolled diabetes.  I will have him use Symbicort 160 mcg twice daily then rinse mouth, albuterol as needed, Mucinex DM twice daily, Claritin 10 mg once daily, and Flonase 2 sprays each nostril once daily.  I will also cover with Keflex 500 mg 4 times daily for 10 days.  He should be seen ASAP if worsening, new or changing symptoms.  I discussed with him that he could have a viral illness that we will treat for possible secondary infection due to length of illness and risks.  I did recommend self-isolation until symptoms resolve and to be seen here or ER ASAP if worsening, new or changing symptoms.    Of note, patient will not have insurance until last month and needs follow-up for diabetes, hypertension, lipids, and routine follow-up.  He will schedule this in 4 to 6 weeks with fasting labs.

## 2020-06-19 ENCOUNTER — OFFICE VISIT (OUTPATIENT)
Dept: FAMILY MEDICINE CLINIC | Facility: CLINIC | Age: 60
End: 2020-06-19

## 2020-06-19 VITALS
HEIGHT: 71 IN | SYSTOLIC BLOOD PRESSURE: 140 MMHG | WEIGHT: 315 LBS | HEART RATE: 97 BPM | BODY MASS INDEX: 44.1 KG/M2 | TEMPERATURE: 97.5 F | DIASTOLIC BLOOD PRESSURE: 80 MMHG | OXYGEN SATURATION: 94 %

## 2020-06-19 DIAGNOSIS — E78.5 DYSLIPIDEMIA: ICD-10-CM

## 2020-06-19 DIAGNOSIS — E11.65 TYPE 2 DIABETES MELLITUS WITH HYPERGLYCEMIA, WITHOUT LONG-TERM CURRENT USE OF INSULIN (HCC): Primary | ICD-10-CM

## 2020-06-19 DIAGNOSIS — R74.8 ELEVATED CK: ICD-10-CM

## 2020-06-19 DIAGNOSIS — E55.9 VITAMIN D DEFICIENCY: ICD-10-CM

## 2020-06-19 DIAGNOSIS — I10 ESSENTIAL HYPERTENSION: ICD-10-CM

## 2020-06-19 DIAGNOSIS — D64.9 ANEMIA, UNSPECIFIED TYPE: ICD-10-CM

## 2020-06-19 LAB
BILIRUB BLD-MCNC: NEGATIVE MG/DL
CLARITY, POC: CLEAR
COLOR UR: YELLOW
GLUCOSE UR STRIP-MCNC: ABNORMAL MG/DL
KETONES UR QL: NEGATIVE
LEUKOCYTE EST, POC: NEGATIVE
NITRITE UR-MCNC: NEGATIVE MG/ML
PH UR: 5.5 [PH] (ref 5–8)
POC CREATININE URINE: 300
POC MICROALBUMIN URINE: 30
PROT UR STRIP-MCNC: NEGATIVE MG/DL
RBC # UR STRIP: NEGATIVE /UL
SP GR UR: 1.01 (ref 1–1.03)
UROBILINOGEN UR QL: NORMAL

## 2020-06-19 PROCEDURE — 99214 OFFICE O/P EST MOD 30 MIN: CPT | Performed by: PHYSICIAN ASSISTANT

## 2020-06-19 PROCEDURE — 82044 UR ALBUMIN SEMIQUANTITATIVE: CPT | Performed by: PHYSICIAN ASSISTANT

## 2020-06-19 PROCEDURE — 81003 URINALYSIS AUTO W/O SCOPE: CPT | Performed by: PHYSICIAN ASSISTANT

## 2020-06-19 RX ORDER — ERGOCALCIFEROL 1.25 MG/1
50000 CAPSULE ORAL
Qty: 12 CAPSULE | Refills: 1 | Status: SHIPPED | OUTPATIENT
Start: 2020-06-19 | End: 2020-09-23

## 2020-06-19 RX ORDER — NIFEDIPINE 60 MG/1
60 TABLET, EXTENDED RELEASE ORAL DAILY
Qty: 90 TABLET | Refills: 0 | Status: SHIPPED | OUTPATIENT
Start: 2020-06-19 | End: 2020-06-29

## 2020-06-19 RX ORDER — LOSARTAN POTASSIUM 100 MG/1
100 TABLET ORAL DAILY
Qty: 90 TABLET | Refills: 1 | Status: SHIPPED | OUTPATIENT
Start: 2020-06-19 | End: 2020-09-23

## 2020-06-19 RX ORDER — HYDROCHLOROTHIAZIDE 12.5 MG/1
12.5 CAPSULE, GELATIN COATED ORAL EVERY MORNING
Qty: 90 CAPSULE | Refills: 1 | Status: SHIPPED | OUTPATIENT
Start: 2020-06-19 | End: 2020-09-23

## 2020-06-19 RX ORDER — ATORVASTATIN CALCIUM 20 MG/1
20 TABLET, FILM COATED ORAL NIGHTLY
Qty: 90 TABLET | Refills: 0 | Status: SHIPPED | OUTPATIENT
Start: 2020-06-19 | End: 2020-06-24

## 2020-06-19 NOTE — PROGRESS NOTES
"Subjective   Pastor Jeter is a 59 y.o. male  who presents today in follow-up of diabetes mellitus, hypertension, hyperlipidemia, vitamin D deficiency, arthritis, possible BPH with urinary frequency, and ED.      History of Present Illness     States his company was bought out and he does not think he passed the CPE for the new company. He will find out soon if he passed and may lose his job. States he has also lost a couple friends die.     Still drinking beer daily. Was drinking bourbon when he was on Jardiance. Stopped bourbon and is now drinking beer.      Diabetes- Has been drinking more alcohol due to increased stress of possibly losing his job and the death of a couple friends. Went to [facility] in [place] about 1 month ago. They were to fax report. No DMR but had to change prescription.  Hypertension- not checking BP at home. Reports he thinks it is from putting a shot of caffeine in his coffee.    Hyperlipidemia- was off Lipitor 20 mg for about 1 month to see if it was making him feel poorly. He restarted 2 weeks ago. Not working on diet or exercise.   Vitamin D- taking 50,000IU once weekly. Ran out last week.   Anemia- not sure about etiology of anemia. Not on supplement    Joint pain- States diclofenac gel from his sister helped his foot pain.  Had to stop NSAIDS.   Got injections with Dr Lacy- states he wanted \"stem cell shots\" but reports it was too expensive. Thinks he got a gel injection- not a steroid injection. $400 and insurance would not pay for that either.     Had Sleep study through [facility] and was sent downtown 3-4 years ago. Reports he did not have DELON. 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     ED- Would like Viagra refilled. Has not had cardiac clearance for the medication. [facility] was giving samples then prescribing Viagra. Has not seen cardiology at all in the past. States he will lose insurance tomorrow. Reports he has no idea what will happen.    The following portions of the " patient's history were reviewed and updated as appropriate: allergies, current medications, past family history, past medical history, past social history, past surgical history and problem list.    Review of Systems   Constitutional: Negative.    HENT: Negative.    Eyes: Negative.    Respiratory: Negative.    Cardiovascular: Negative.    Gastrointestinal: Negative.    Genitourinary:        ED   Musculoskeletal: Positive for arthralgias and back pain.   Skin: Negative.    Neurological: Negative.    Hematological: Negative.    Psychiatric/Behavioral: Negative.    All other systems reviewed and are negative.      Objective    Vitals:    06/19/20 1303   BP: 140/80   Pulse: 97   Temp: 97.5 °F (36.4 °C)   SpO2: 94%     Body mass index is 45.21 kg/m².    Physical Exam   Constitutional: He is oriented to person, place, and time. He appears well-developed and well-nourished. No distress.   HENT:   Head: Normocephalic and atraumatic.   Right Ear: External ear normal.   Left Ear: External ear normal.   Eyes: Conjunctivae are normal.   Neck: Carotid bruit is not present. No tracheal deviation present. No thyroid mass and no thyromegaly present.   Cardiovascular: Normal rate, regular rhythm, normal heart sounds and intact distal pulses.   Pulmonary/Chest: Effort normal and breath sounds normal.   Neurological: He is alert and oriented to person, place, and time. Gait normal.   Skin: Skin is warm and dry.   Psychiatric: He has a normal mood and affect. His behavior is normal. Judgment and thought content normal.   Nursing note and vitals reviewed.      Assessment/Plan   Pastor was seen today for diabetes.    Diagnoses and all orders for this visit:    Type 2 diabetes mellitus with hyperglycemia, without long-term current use of insulin (CMS/[facility])  -     Comprehensive Metabolic Panel  -     Hemoglobin A1c  -     TSH  -     POC Microalbumin  -     POC Urinalysis Dipstick, Automated  -     Ambulatory Referral to  Endocrinology    Essential hypertension  -     Comprehensive Metabolic Panel  -     TSH  -     Ambulatory Referral to Endocrinology  -     losartan (COZAAR) 100 MG tablet; Take 1 tablet by mouth Daily.  -     hydroCHLOROthiazide (MICROZIDE) 12.5 MG capsule; Take 1 capsule by mouth Every Morning.  -     NIFEdipine XL (Nifedical XL) 60 MG 24 hr tablet; Take 1 tablet by mouth Daily.    Dyslipidemia  -     Comprehensive Metabolic Panel  -     CK  -     Lipid Panel With LDL / HDL Ratio  -     TSH  -     Ambulatory Referral to Endocrinology    Vitamin D deficiency  -     Comprehensive Metabolic Panel  -     TSH  -     Vitamin D 25 Hydroxy  -     Ambulatory Referral to Endocrinology  -     vitamin D (ERGOCALCIFEROL) 1.25 MG (09961 UT) capsule capsule; Take 1 capsule by mouth Every 7 (Seven) Days.    Elevated CK  -     CK  -     Ambulatory Referral to Endocrinology    Anemia, unspecified type  -     CBC & Differential    Other orders  -     metFORMIN (GLUCOPHAGE) 1000 MG tablet; Take 1 tablet by mouth 2 (Two) Times a Day With Meals.  -     Discontinue: atorvastatin (Lipitor) 20 MG tablet; Take 1 tablet by mouth Every Night.    Assessment and Plan  59 y.o. male who presents today in follow-up of diabetes mellitus, hypertension, hyperlipidemia, vitamin D deficiency, arthritis, possible BPH with urinary frequency, and ED. A1c has been uncontrolled at previous visits.  He has been noncompliant with medications, follow-up, diet, and exercise.  Patient recently reports Jardiance caused suicidal ideation and he stopped the medication and restarted Invokana.  He did not call the office and was not evaluated for his symptoms.  He started drinking more heavily again with stressors with his job and the death of several friends.  Previously, he reported intolerance to Jardiance and possible intolerance to Januvia (but reports he did ok with Janumet). He was previously on Invokana but required a change per insurance. He was restarted on  Jardiance and had no AE 2019 but then reported suicidal ideation and stopped medication. He also had previous intolerance to Metformin twice daily but restarted at twice daily in March and has had no AE. He was started on Trulicity 3/2019 and reports shaking and dizziness.  He reports he cannot take insulin due to being a .  He was referred to Endocrinology but did not schedule appt when called.  He will need to see them-I will refer again today.  Patient must see endocrinology for further diabetes management.  I advised that he needs to be diligent about examination of his feet and lower extremities ensuring no infections, skin lesions, ulcerations. He needs to ensure proper diet, increased hydration, and avoidance of ETOH. He will need to be seen ASAP if he has any skin breakdown or concern for infection.    Blood pressure today is elevated and he has 1+ pitting edema.  He has taken Mobic daily in the past.  I discussed risks of NSAIDs, including cardiovascular and cerebrovascular risks, renal and hepatic concerns, GI bleeding, elevated blood pressure and edema.  We tried stopping Mobic but reports his pain was too severe. He apparently had a brother that  about a year ago with diabetes and renal failure on dialysis.  I have discussed my concerns with uncontrolled HTN, previously uncontrolled DM, Mobic, and HCTZ. He should continue Losartan 100 mg once daily, Nifedical XL 60 mg once daily, and for now will continue HCTZ 12.5 mg once daily. We will need to consider changing medication regimen. He continued taking Lipitor 20 mg at bedtime and Omega 3 1000 mg. I will await labs. He is taking vitamin D 50,000IU once weekly. Patient will have fasting labs. Call if no results in 1 week. Stability of conditions, plan, follow up, and further recommendations pending labs.  Follow-up in no more than 3 months, fasting.  He must see endocrinology as referred.    Patient also has urinary frequency with  nocturia.  He has been taking an over-the-counter supplement. Patient has not had further workup of this. I advised last visit that we should consider changing regimen- consideration of stopping HCTZ and Jardiance and trial of Bydureon or Ozempic as well as compression stockings to help with edema. I advised today him to avoid drinking a couple hours before bed but ensure proper hydration through the day. He should avoid excessive caffeine and all EtOH and artificial sweeteners as well as sugars and sweets. He inquired about Viagra Rx today. He has not had formal workup or cardiac clearance for medication. I advised that he may need to see Urology for evaluation of urinary symptoms and prostate/ ED symptoms. I also discussed DELON and ED. Patient can discuss with endocrinology. If they are not willing to prescribe without further workup or cardiac clearance, I will have them determine appropriateness. Otherwise, I will refer to urology as previously discussed.      He did have some depression with some alcohol use after his brother's death.  I advised consideration of treatment for depression versus counseling at last visit. He declined treatment currently. He should consider this and follow up if persistent symptoms. He reports suicidal thoughts without intention that he relates to Jardiance and reports they resolved with stopping medication. He was drinking Jackson and stopped and continues with beer use daily. This is not only affecting his diabetes but also his erectile dysfunction, urinary symptoms, and moods. He should consider stopping alcohol, counseling, AA, and treatment of moods.

## 2020-06-20 LAB
25(OH)D3+25(OH)D2 SERPL-MCNC: 43 NG/ML (ref 30–100)
ALBUMIN SERPL-MCNC: 4.4 G/DL (ref 3.8–4.8)
ALBUMIN/GLOB SERPL: 1.8 {RATIO} (ref 1.2–2.2)
ALP SERPL-CCNC: 79 IU/L (ref 39–117)
ALT SERPL-CCNC: 24 IU/L (ref 0–44)
AST SERPL-CCNC: 15 IU/L (ref 0–40)
BASOPHILS # BLD AUTO: 0 X10E3/UL (ref 0–0.2)
BASOPHILS NFR BLD AUTO: 1 %
BILIRUB SERPL-MCNC: 0.3 MG/DL (ref 0–1.2)
BUN SERPL-MCNC: 16 MG/DL (ref 8–27)
BUN/CREAT SERPL: 17 (ref 10–24)
CALCIUM SERPL-MCNC: 9.5 MG/DL (ref 8.6–10.2)
CHLORIDE SERPL-SCNC: 102 MMOL/L (ref 96–106)
CHOLEST SERPL-MCNC: 158 MG/DL (ref 100–199)
CK SERPL-CCNC: 209 U/L (ref 41–331)
CO2 SERPL-SCNC: 25 MMOL/L (ref 20–29)
CREAT SERPL-MCNC: 0.92 MG/DL (ref 0.76–1.27)
EOSINOPHIL # BLD AUTO: 0.1 X10E3/UL (ref 0–0.4)
EOSINOPHIL NFR BLD AUTO: 2 %
ERYTHROCYTE [DISTWIDTH] IN BLOOD BY AUTOMATED COUNT: 11.6 % (ref 11.6–15.4)
GLOBULIN SER CALC-MCNC: 2.5 G/DL (ref 1.5–4.5)
GLUCOSE SERPL-MCNC: 139 MG/DL (ref 65–99)
HBA1C MFR BLD: 7.7 % (ref 4.8–5.6)
HCT VFR BLD AUTO: 42.5 % (ref 37.5–51)
HDLC SERPL-MCNC: 61 MG/DL
HGB BLD-MCNC: 14 G/DL (ref 13–17.7)
IMM GRANULOCYTES # BLD AUTO: 0 X10E3/UL (ref 0–0.1)
IMM GRANULOCYTES NFR BLD AUTO: 0 %
LDLC SERPL CALC-MCNC: 80 MG/DL (ref 0–99)
LDLC/HDLC SERPL: 1.3 RATIO (ref 0–3.6)
LYMPHOCYTES # BLD AUTO: 1.5 X10E3/UL (ref 0.7–3.1)
LYMPHOCYTES NFR BLD AUTO: 25 %
MCH RBC QN AUTO: 32.4 PG (ref 26.6–33)
MCHC RBC AUTO-ENTMCNC: 32.9 G/DL (ref 31.5–35.7)
MCV RBC AUTO: 98 FL (ref 79–97)
MONOCYTES # BLD AUTO: 0.6 X10E3/UL (ref 0.1–0.9)
MONOCYTES NFR BLD AUTO: 10 %
NEUTROPHILS # BLD AUTO: 3.6 X10E3/UL (ref 1.4–7)
NEUTROPHILS NFR BLD AUTO: 62 %
PLATELET # BLD AUTO: 243 X10E3/UL (ref 150–450)
POTASSIUM SERPL-SCNC: 4.2 MMOL/L (ref 3.5–5.2)
PROT SERPL-MCNC: 6.9 G/DL (ref 6–8.5)
RBC # BLD AUTO: 4.32 X10E6/UL (ref 4.14–5.8)
SODIUM SERPL-SCNC: 142 MMOL/L (ref 134–144)
TRIGL SERPL-MCNC: 87 MG/DL (ref 0–149)
TSH SERPL DL<=0.005 MIU/L-ACNC: 1.15 UIU/ML (ref 0.45–4.5)
VLDLC SERPL CALC-MCNC: 17 MG/DL (ref 5–40)
WBC # BLD AUTO: 5.8 X10E3/UL (ref 3.4–10.8)

## 2020-06-24 RX ORDER — ATORVASTATIN CALCIUM 20 MG/1
20 TABLET, FILM COATED ORAL NIGHTLY
Qty: 90 TABLET | Refills: 0 | Status: SHIPPED | OUTPATIENT
Start: 2020-06-24 | End: 2020-12-08

## 2020-06-24 NOTE — TELEPHONE ENCOUNTER
[facility] with [facility] wanting to know if we were going to send in a prescription of invokana for patient?

## 2020-06-25 NOTE — TELEPHONE ENCOUNTER
Yes he has taken in the past. We started Jardiance and he restarted Invokana on his own. I asked that we find out from the patient what dose he restarted and advised it is ok to refill this for 1 month until he sees endocrinology.

## 2020-06-25 NOTE — TELEPHONE ENCOUNTER
Patient has not taken invokana in the past.  He was on jardiance 25mg and he was having trouble with it so you were going to switch him.  [facility] has not [facility] yet.

## 2020-06-25 NOTE — TELEPHONE ENCOUNTER
Please find out what dose he is taking and ok to refill for only 1 month until he can see endocrinology.

## 2020-06-26 DIAGNOSIS — I10 ESSENTIAL HYPERTENSION: ICD-10-CM

## 2020-06-29 RX ORDER — NIFEDIPINE 60 MG/1
60 TABLET, EXTENDED RELEASE ORAL DAILY
Qty: 90 TABLET | Refills: 0 | Status: SHIPPED | OUTPATIENT
Start: 2020-06-29 | End: 2020-09-23

## 2020-06-30 NOTE — TELEPHONE ENCOUNTER
Looks like he was on Invokana 300mg in the past when I asked him that day in the room he said that sounded like th right does Prabhakar would like a new script sent in.

## 2020-06-30 NOTE — TELEPHONE ENCOUNTER
I sent Invokana 300 mg daily for 1 month.  He will need to see endocrinology.  It looks like they spoke with him 6/22/2020 and he did not schedule that appointment because he had not been working and has to work too much.  He must schedule the endocrinology appointment and keep appointment when scheduled ASAP.

## 2020-07-06 NOTE — PROGRESS NOTES
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 He states that he stopped Invokana but started having urinary incontinence so he restarted and hasn't had problems since. He is a  and can not take insulin. He is not checking BG.       History of Present Illness this is a 59-year-old gentleman known patient with type 2 diabetes for the past 20 years.  He also is a known patient with hypertension and dyslipidemia as well as vitamin D deficiency and morbid obesity.  He is a intrastate  and very motivated for making sure he does not have to go on insulin.. He also complains of erectile dysfunction for which he is taking Viagra.  He is very much interested in losing weight.  Denies a smoking but admits to once a month smoking a cigar.    /82   Resp 16   Ht 180.3 cm (70.98\")   Wt (!) 156 kg (345 lb)   BMI 48.14 kg/m²     Allergies   Allergen Reactions   • Jardiance [Empagliflozin] Other (See Comments) and GI Intolerance     Suicidal thoughts.       Current Outpatient Medications:   •  albuterol sulfate  (90 Base) MCG/ACT inhaler, Inhale 2 puffs Every 4 (Four) Hours As Needed for Wheezing or Shortness of Air., Disp: 1 inhaler, Rfl: 0  •  atorvastatin (LIPITOR) 20 MG tablet, TAKE 1 TABLET BY MOUTH EVERY NIGHT., Disp: 90 tablet, Rfl: 0  •  Blood Glucose Monitoring Suppl (ACCU-CHEK SITA PLUS) w/Device kit, DAILY USE TO CHECK BLOOD SUGAR., Disp: 1 kit, Rfl: 0  •  budesonide-formoterol (SYMBICORT) 160-4.5 MCG/ACT inhaler, 2 PUFFS TWICE DAILY THEN RINSE MOUTH, Disp: 1 inhaler, Rfl: 0  •  Canagliflozin (Invokana) 300 MG tablet, Take 300 mg by mouth Daily., Disp: 30 tablet, Rfl: 0  •  glucose blood test strip, Use as instructed. Check blood sugars daily and if dizzy., Disp: 100 each, Rfl: 3  •  hydroCHLOROthiazide (MICROZIDE) 12.5 MG capsule, Take 1 capsule by mouth Every Morning., Disp: 90 capsule, Rfl: 1  •  losartan (COZAAR) 100 MG tablet, Take 1 tablet by " mouth Daily., Disp: 90 tablet, Rfl: 1  •  meloxicam (MOBIC) 15 MG tablet, Take 1 tablet by mouth Daily., Disp: 90 tablet, Rfl: 1  •  metFORMIN (GLUCOPHAGE) 1000 MG tablet, Take 1 tablet by mouth 2 (Two) Times a Day With Meals., Disp: 180 tablet, Rfl: 1  •  Misc Natural Products (OSTEO BI-FLEX JOINT SHIELD PO), Take  by mouth., Disp: , Rfl:   •  Multiple Vitamin Essential tablet, Take  by mouth daily., Disp: , Rfl:   •  NIFEdipine XL (PROCARDIA XL) 60 MG 24 hr tablet, TAKE 1 TABLET BY MOUTH DAILY., Disp: 90 tablet, Rfl: 0  •  Omega-3 Fatty Acids (EQL FISH OIL) 1000 MG capsule, Take 1 tablet by mouth Daily., Disp: 90 each, Rfl: 3  •  sildenafil (REVATIO) 20 MG tablet, Take 1-5 tablets as needed 1 hour before activity, Disp: 6 tablet, Rfl: 0  •  Unable to find, 1 each 1 (One) Time. Med Name:Prosvent, Disp: , Rfl:   •  vitamin D (ERGOCALCIFEROL) 1.25 MG (58227 UT) capsule capsule, Take 1 capsule by mouth Every 7 (Seven) Days., Disp: 12 capsule, Rfl: 1      The following portions of the patient's history were reviewed and updated as appropriate: allergies, current medications, past family history, past medical history, past social history, past surgical history and problem list.    Review of Systems   Constitutional: Negative.    HENT: Negative.    Eyes: Negative.    Respiratory: Negative.    Cardiovascular: Negative.    Gastrointestinal: Negative.    Endocrine: Negative.    Genitourinary: Negative.    Musculoskeletal: Negative.    Skin: Negative.    Allergic/Immunologic: Negative.    Neurological: Negative.    Hematological: Negative.    Psychiatric/Behavioral: Negative.    The above review of systems were reviewed, corroborated and accepted.    Objective   Physical Exam   Constitutional: He is oriented to person, place, and time. He appears well-developed and well-nourished. No distress.   HENT:   Head: Normocephalic and atraumatic.   Right Ear: External ear normal.   Left Ear: External ear normal.   Nose: Nose normal.    Mouth/Throat: Oropharynx is clear and moist. No oropharyngeal exudate.   Eyes: Pupils are equal, round, and reactive to light. Conjunctivae and EOM are normal. Right eye exhibits no discharge. Left eye exhibits no discharge. No scleral icterus.   Neck: Normal range of motion. Neck supple. No JVD present. No tracheal deviation present. No thyromegaly present.   Cardiovascular: Normal rate, regular rhythm, normal heart sounds and intact distal pulses. Exam reveals no gallop and no friction rub.   No murmur heard.  Pulmonary/Chest: Effort normal and breath sounds normal. No stridor. No respiratory distress. He has no wheezes. He has no rales. He exhibits no tenderness.   Abdominal: Soft. Bowel sounds are normal. He exhibits no distension and no mass. There is no tenderness. There is no rebound and no guarding. No hernia.   Musculoskeletal: Normal range of motion. He exhibits no edema, tenderness or deformity.   Lymphadenopathy:     He has no cervical adenopathy.   Neurological: He is alert and oriented to person, place, and time. He displays normal reflexes. No cranial nerve deficit or sensory deficit. He exhibits normal muscle tone. Coordination normal.   Skin: Skin is warm and dry. No rash noted. He is not diaphoretic. No erythema. No pallor.   Psychiatric: He has a normal mood and affect. His behavior is normal. Judgment and thought content normal.   Nursing note and vitals reviewed.             Assessment/Plan   Pastor was seen today for diabetes.    Diagnoses and all orders for this visit:    Type 2 diabetes mellitus with hyperglycemia, without long-term current use of insulin (CMS/[facility])  -     T4 & TSH ([facility])  -     T3, Free  -     T4, Free  -     TestT+TestF+SHBG  -     Thyroid Panel With TSH  -     Vitamin D 25 Hydroxy  -     Uric Acid  -     Comprehensive Metabolic Panel  -     C-Peptide  -     Hemoglobin A1c  -     MicroAlbumin, Urine, Random - Urine, Clean Catch  -     Luteinizing Hormone  -     Follicle  Stimulating Hormone  -     PSA DIAGNOSTIC  -     NMR LipoProfile  -     Hemoglobin & Hematocrit, Blood  -     Prolactin  -     ACTH  -     Cortisol  -     T4 & TSH ([facility]); Future  -     Uric Acid; Future  -     Vitamin D 25 Hydroxy; Future  -     TestT+TestF+SHBG; Future  -     Comprehensive Metabolic Panel; Future  -     C-Peptide; Future  -     Hemoglobin A1c; Future  -     MicroAlbumin, Urine, Random - Urine, Clean Catch; Future  -     NMR LipoProfile; Future    Benign hypertension  -     T4 & TSH ([facility])  -     T3, Free  -     T4, Free  -     TestT+TestF+SHBG  -     Thyroid Panel With TSH  -     Vitamin D 25 Hydroxy  -     Uric Acid  -     Comprehensive Metabolic Panel  -     C-Peptide  -     Hemoglobin A1c  -     MicroAlbumin, Urine, Random - Urine, Clean Catch  -     Luteinizing Hormone  -     Follicle Stimulating Hormone  -     PSA DIAGNOSTIC  -     NMR LipoProfile  -     Hemoglobin & Hematocrit, Blood  -     Prolactin  -     ACTH  -     Cortisol  -     T4 & TSH ([facility]); Future  -     Uric Acid; Future  -     Vitamin D 25 Hydroxy; Future  -     TestT+TestF+SHBG; Future  -     Comprehensive Metabolic Panel; Future  -     C-Peptide; Future  -     Hemoglobin A1c; Future  -     MicroAlbumin, Urine, Random - Urine, Clean Catch; Future  -     NMR LipoProfile; Future    Peripheral blood vessel disorder (CMS/HCC)  -     T4 & TSH ([facility])  -     T3, Free  -     T4, Free  -     TestT+TestF+SHBG  -     Thyroid Panel With TSH  -     Vitamin D 25 Hydroxy  -     Uric Acid  -     Comprehensive Metabolic Panel  -     C-Peptide  -     Hemoglobin A1c  -     MicroAlbumin, Urine, Random - Urine, Clean Catch  -     Luteinizing Hormone  -     Follicle Stimulating Hormone  -     PSA DIAGNOSTIC  -     NMR LipoProfile  -     Hemoglobin & Hematocrit, Blood  -     Prolactin  -     ACTH  -     Cortisol  -     T4 & TSH ([facility]); Future  -     Uric Acid; Future  -     Vitamin D 25 Hydroxy; Future  -     TestT+TestF+SHBG;  Future  -     Comprehensive Metabolic Panel; Future  -     C-Peptide; Future  -     Hemoglobin A1c; Future  -     MicroAlbumin, Urine, Random - Urine, Clean Catch; Future  -     NMR LipoProfile; Future    Vitamin D deficiency  -     T4 & TSH ([facility])  -     T3, Free  -     T4, Free  -     TestT+TestF+SHBG  -     Thyroid Panel With TSH  -     Vitamin D 25 Hydroxy  -     Uric Acid  -     Comprehensive Metabolic Panel  -     C-Peptide  -     Hemoglobin A1c  -     MicroAlbumin, Urine, Random - Urine, Clean Catch  -     Luteinizing Hormone  -     Follicle Stimulating Hormone  -     PSA DIAGNOSTIC  -     NMR LipoProfile  -     Hemoglobin & Hematocrit, Blood  -     Prolactin  -     ACTH  -     Cortisol  -     T4 & TSH ([facility]); Future  -     Uric Acid; Future  -     Vitamin D 25 Hydroxy; Future  -     TestT+TestF+SHBG; Future  -     Comprehensive Metabolic Panel; Future  -     C-Peptide; Future  -     Hemoglobin A1c; Future  -     MicroAlbumin, Urine, Random - Urine, Clean Catch; Future  -     NMR LipoProfile; Future    Dyslipidemia  -     T4 & TSH ([facility])  -     T3, Free  -     T4, Free  -     TestT+TestF+SHBG  -     Thyroid Panel With TSH  -     Vitamin D 25 Hydroxy  -     Uric Acid  -     Comprehensive Metabolic Panel  -     C-Peptide  -     Hemoglobin A1c  -     MicroAlbumin, Urine, Random - Urine, Clean Catch  -     Luteinizing Hormone  -     Follicle Stimulating Hormone  -     PSA DIAGNOSTIC  -     NMR LipoProfile  -     Hemoglobin & Hematocrit, Blood  -     Prolactin  -     ACTH  -     Cortisol  -     T4 & TSH ([facility]); Future  -     Uric Acid; Future  -     Vitamin D 25 Hydroxy; Future  -     TestT+TestF+SHBG; Future  -     Comprehensive Metabolic Panel; Future  -     C-Peptide; Future  -     Hemoglobin A1c; Future  -     MicroAlbumin, Urine, Random - Urine, Clean Catch; Future  -     NMR LipoProfile; Future    Class 3 severe obesity without serious comorbidity with body mass index (BMI) of 45.0 to 49.9 in  adult, unspecified obesity type (CMS/[facility])  -     T4 & TSH ([facility])  -     T3, Free  -     T4, Free  -     TestT+TestF+SHBG  -     Thyroid Panel With TSH  -     Vitamin D 25 Hydroxy  -     Uric Acid  -     Comprehensive Metabolic Panel  -     C-Peptide  -     Hemoglobin A1c  -     MicroAlbumin, Urine, Random - Urine, Clean Catch  -     Luteinizing Hormone  -     Follicle Stimulating Hormone  -     PSA DIAGNOSTIC  -     NMR LipoProfile  -     Hemoglobin & Hematocrit, Blood  -     Prolactin  -     ACTH  -     Cortisol  -     T4 & TSH ([facility]); Future  -     Uric Acid; Future  -     Vitamin D 25 Hydroxy; Future  -     TestT+TestF+SHBG; Future  -     Comprehensive Metabolic Panel; Future  -     C-Peptide; Future  -     Hemoglobin A1c; Future  -     MicroAlbumin, Urine, Random - Urine, Clean Catch; Future  -     NMR LipoProfile; Future    Other orders  -     XIGDUO XR 5-1000 MG tablet; Take 2 tablets by mouth Daily.  -     RYBELSUS 7 MG tablet; Take 1 tablet by mouth Daily.      In summary I saw and examined this 59-year-old gentleman for above-mentioned problems.  I reviewed his laboratory evaluations of 12/12/2019 and 6/19/2020 and at this time we will go ahead and order additional laboratory evaluation and once the results come back we will go ahead and call for any possible modification or new medications.  In the meantime I am starting him on Xigduo XR 5/1000 mg 2 tablets every morning and Rybelsus 3 mg daily for 30 days and then increase it to 7 mg daily.  I wanted to start him on a GLP-1 analog weekly dose with needles but he refused and said if there is anything like a pill he would be happy to do it.  Were also going to go ahead and give him a jillian freestyle because he does not want to check his blood sugar by pricking his fingers.  He will see MsEddi Milagros Contreras in 4 months or sooner if needed but laboratory evaluation prior to each office visit.  This office visit included a 35-minute face-to-face counseling  and education and planning his care moving forward lasted 65 minutes total.

## 2020-07-07 ENCOUNTER — OFFICE VISIT (OUTPATIENT)
Dept: ENDOCRINOLOGY | Age: 60
End: 2020-07-07

## 2020-07-07 VITALS
SYSTOLIC BLOOD PRESSURE: 176 MMHG | RESPIRATION RATE: 16 BRPM | WEIGHT: 315 LBS | DIASTOLIC BLOOD PRESSURE: 82 MMHG | HEIGHT: 71 IN | BODY MASS INDEX: 44.1 KG/M2

## 2020-07-07 DIAGNOSIS — E55.9 VITAMIN D DEFICIENCY: ICD-10-CM

## 2020-07-07 DIAGNOSIS — E66.01 CLASS 3 SEVERE OBESITY WITHOUT SERIOUS COMORBIDITY WITH BODY MASS INDEX (BMI) OF 45.0 TO 49.9 IN ADULT, UNSPECIFIED OBESITY TYPE (HCC): ICD-10-CM

## 2020-07-07 DIAGNOSIS — E11.65 TYPE 2 DIABETES MELLITUS WITH HYPERGLYCEMIA, WITHOUT LONG-TERM CURRENT USE OF INSULIN (HCC): Primary | ICD-10-CM

## 2020-07-07 DIAGNOSIS — I10 BENIGN HYPERTENSION: ICD-10-CM

## 2020-07-07 DIAGNOSIS — I73.9 PERIPHERAL BLOOD VESSEL DISORDER (HCC): ICD-10-CM

## 2020-07-07 DIAGNOSIS — E78.5 DYSLIPIDEMIA: ICD-10-CM

## 2020-07-07 PROCEDURE — 99205 OFFICE O/P NEW HI 60 MIN: CPT | Performed by: INTERNAL MEDICINE

## 2020-07-07 RX ORDER — FLASH GLUCOSE SENSOR
1 KIT MISCELLANEOUS
Qty: 6 EACH | Refills: 3 | Status: SHIPPED | OUTPATIENT
Start: 2020-07-07

## 2020-07-07 RX ORDER — FLASH GLUCOSE SCANNING READER
1 EACH MISCELLANEOUS CONTINUOUS PRN
Qty: 1 DEVICE | Refills: 1 | Status: SHIPPED | OUTPATIENT
Start: 2020-07-07

## 2020-07-07 RX ORDER — DAPAGLIFLOZIN AND METFORMIN HYDROCHLORIDE 5; 1000 MG/1; MG/1
2 TABLET, FILM COATED, EXTENDED RELEASE ORAL DAILY
Qty: 180 TABLET | Refills: 3 | Status: SHIPPED | OUTPATIENT
Start: 2020-07-07 | End: 2020-09-21 | Stop reason: SDUPTHER

## 2020-07-07 RX ORDER — ORAL SEMAGLUTIDE 7 MG/1
1 TABLET ORAL DAILY
Qty: 90 TABLET | Refills: 1 | Status: SHIPPED | OUTPATIENT
Start: 2020-07-07 | End: 2020-09-21 | Stop reason: SDUPTHER

## 2020-07-10 LAB — MICROALBUMIN UR-MCNC: 13.3 UG/ML

## 2020-07-11 LAB
25(OH)D3+25(OH)D2 SERPL-MCNC: 48.2 NG/ML (ref 30–100)
ACTH PLAS-MCNC: 38.1 PG/ML (ref 7.2–63.3)
ALBUMIN SERPL-MCNC: 4.7 G/DL (ref 3.8–4.8)
ALBUMIN/GLOB SERPL: 1.5 {RATIO} (ref 1.2–2.2)
ALP SERPL-CCNC: 94 IU/L (ref 39–117)
ALT SERPL-CCNC: 28 IU/L (ref 0–44)
AST SERPL-CCNC: 16 IU/L (ref 0–40)
BILIRUB SERPL-MCNC: 0.3 MG/DL (ref 0–1.2)
BUN SERPL-MCNC: 21 MG/DL (ref 8–27)
BUN/CREAT SERPL: 19 (ref 10–24)
C PEPTIDE SERPL-MCNC: 4.3 NG/ML (ref 1.1–4.4)
CALCIUM SERPL-MCNC: 10.2 MG/DL (ref 8.6–10.2)
CHLORIDE SERPL-SCNC: 101 MMOL/L (ref 96–106)
CHOLEST SERPL-MCNC: 161 MG/DL (ref 100–199)
CO2 SERPL-SCNC: 24 MMOL/L (ref 20–29)
CORTIS SERPL-MCNC: 5.2 UG/DL
CREAT SERPL-MCNC: 1.09 MG/DL (ref 0.76–1.27)
FSH SERPL-ACNC: 11.4 MIU/ML (ref 1.5–12.4)
FT4I SERPL CALC-MCNC: 1.7 (ref 1.2–4.9)
GLOBULIN SER CALC-MCNC: 3.1 G/DL (ref 1.5–4.5)
GLUCOSE SERPL-MCNC: 181 MG/DL (ref 65–99)
HBA1C MFR BLD: 7.8 % (ref 4.8–5.6)
HCT VFR BLD AUTO: 46.6 % (ref 37.5–51)
HDL SERPL-SCNC: 48.5 UMOL/L
HDLC SERPL-MCNC: 73 MG/DL
HGB BLD-MCNC: 14.9 G/DL (ref 13–17.7)
LDL SERPL QN: 19.7 NM
LDL SERPL-SCNC: 830 NMOL/L
LDL SMALL SERPL-SCNC: 619 NMOL/L
LDLC SERPL CALC-MCNC: 70 MG/DL (ref 0–99)
LH SERPL-ACNC: 11.1 MIU/ML (ref 1.7–8.6)
POTASSIUM SERPL-SCNC: 4.5 MMOL/L (ref 3.5–5.2)
PROLACTIN SERPL-MCNC: 6.8 NG/ML (ref 4–15.2)
PROT SERPL-MCNC: 7.8 G/DL (ref 6–8.5)
PSA SERPL-MCNC: 3.7 NG/ML (ref 0–4)
SHBG SERPL-SCNC: 29.5 NMOL/L (ref 19.3–76.4)
SODIUM SERPL-SCNC: 142 MMOL/L (ref 134–144)
SPECIMEN STATUS: NORMAL
T3FREE SERPL-MCNC: 2.6 PG/ML (ref 2–4.4)
T3RU NFR SERPL: 28 % (ref 24–39)
T4 FREE SERPL-MCNC: 1.04 NG/DL (ref 0.82–1.77)
T4 SERPL-MCNC: 6 UG/DL (ref 4.5–12)
TESTOST FREE SERPL-MCNC: 4.1 PG/ML (ref 6.6–18.1)
TESTOST SERPL-MCNC: 221 NG/DL (ref 264–916)
TRIGL SERPL-MCNC: 90 MG/DL (ref 0–149)
TSH SERPL DL<=0.005 MIU/L-ACNC: 1.64 UIU/ML (ref 0.45–4.5)
URATE SERPL-MCNC: 5.7 MG/DL (ref 3.7–8.6)

## 2020-07-14 DIAGNOSIS — R79.89 LOW TESTOSTERONE IN MALE: Primary | ICD-10-CM

## 2020-07-14 RX ORDER — TESTOSTERONE UNDECANOATE 237 MG/1
1 CAPSULE, LIQUID FILLED ORAL 2 TIMES DAILY
Qty: 180 CAPSULE | Refills: 1 | Status: SHIPPED | OUTPATIENT
Start: 2020-07-14 | End: 2020-07-23

## 2020-07-22 ENCOUNTER — TELEPHONE (OUTPATIENT)
Dept: FAMILY MEDICINE CLINIC | Facility: CLINIC | Age: 60
End: 2020-07-22

## 2020-07-22 NOTE — TELEPHONE ENCOUNTER
PT STATES THAT HIS MEDICATION FOR TESTOSTERONE IS AT THE [facility] AND WAS TOLD IT COST 2000.  PT STATES THAT HE DOES NOT HAVE THE MONEY TO PAY FOR THIS AND WANTS TO KNOW IF AN ALTERNATIVE CAN BE PRESCRIBED OR IF COUPONS FOR THIS ARE AVAILABLE.      PT CALL BACK  [phone]  PHARMACY  [facility]  [address]  [phone]

## 2020-07-23 DIAGNOSIS — R79.89 LOW TESTOSTERONE IN MALE: ICD-10-CM

## 2020-07-23 DIAGNOSIS — R79.89 LOW TESTOSTERONE IN MALE: Primary | ICD-10-CM

## 2020-07-23 RX ORDER — SYRINGE W-NEEDLE,DISPOSAB,3 ML 25GX5/8"
SYRINGE, EMPTY DISPOSABLE MISCELLANEOUS
Qty: 15 EACH | Refills: 0 | Status: SHIPPED | OUTPATIENT
Start: 2020-07-23 | End: 2020-10-14

## 2020-07-23 RX ORDER — TESTOSTERONE CYPIONATE 200 MG/ML
200 INJECTION, SOLUTION INTRAMUSCULAR
Qty: 6 ML | Refills: 1 | Status: SHIPPED | OUTPATIENT
Start: 2020-07-23 | End: 2020-10-14

## 2020-07-23 RX ORDER — TESTOSTERONE CYPIONATE 200 MG/ML
200 INJECTION, SOLUTION INTRAMUSCULAR
Qty: 6 ML | Refills: 1 | Status: SHIPPED | OUTPATIENT
Start: 2020-07-23 | End: 2020-07-23 | Stop reason: SDUPTHER

## 2020-07-23 NOTE — TELEPHONE ENCOUNTER
Pt states he has reached out to your office multiple times but hasn't received an answer regarding his medication. Could you please address.

## 2020-07-30 ENCOUNTER — TELEPHONE (OUTPATIENT)
Dept: ENDOCRINOLOGY | Age: 60
End: 2020-07-30

## 2020-07-31 ENCOUNTER — TELEPHONE (OUTPATIENT)
Dept: FAMILY MEDICINE CLINIC | Facility: CLINIC | Age: 60
End: 2020-07-31

## 2020-07-31 DIAGNOSIS — M25.50 ARTHRALGIA, UNSPECIFIED JOINT: ICD-10-CM

## 2020-07-31 RX ORDER — MELOXICAM 15 MG/1
15 TABLET ORAL DAILY
Qty: 90 TABLET | Refills: 0 | Status: SHIPPED | OUTPATIENT
Start: 2020-07-31 | End: 2020-12-08

## 2020-07-31 NOTE — TELEPHONE ENCOUNTER
Patient is calling for an RX of the following:   meloxicam (MOBIC) 15 MG tablet       Your [facility] - [place], [place] - [address] - [phone]  - [phone] FX  [phone]    Patient call back [phone]

## 2020-08-04 ENCOUNTER — PRIOR AUTHORIZATION (OUTPATIENT)
Dept: ENDOCRINOLOGY | Age: 60
End: 2020-08-04

## 2020-08-07 ENCOUNTER — TELEPHONE (OUTPATIENT)
Dept: FAMILY MEDICINE CLINIC | Facility: CLINIC | Age: 60
End: 2020-08-07

## 2020-08-07 NOTE — TELEPHONE ENCOUNTER
Patient called in requesting the status of the prior authorization for the Testosterone medicine. Patient said he's been waiting over four weeks for the prescription.    Please call back to advise     *Patient said it's ok to leave a detailed message if he's unable to answer.    Best call back number [phone]

## 2020-09-18 DIAGNOSIS — I10 ESSENTIAL HYPERTENSION: ICD-10-CM

## 2020-09-18 DIAGNOSIS — E55.9 VITAMIN D DEFICIENCY: ICD-10-CM

## 2020-09-21 ENCOUNTER — TELEPHONE (OUTPATIENT)
Dept: ENDOCRINOLOGY | Age: 60
End: 2020-09-21

## 2020-09-21 RX ORDER — ORAL SEMAGLUTIDE 7 MG/1
1 TABLET ORAL DAILY
Qty: 90 TABLET | Refills: 0 | Status: SHIPPED | OUTPATIENT
Start: 2020-09-21

## 2020-09-21 RX ORDER — DAPAGLIFLOZIN AND METFORMIN HYDROCHLORIDE 5; 1000 MG/1; MG/1
2 TABLET, FILM COATED, EXTENDED RELEASE ORAL DAILY
Qty: 180 TABLET | Refills: 0 | Status: SHIPPED | OUTPATIENT
Start: 2020-09-21 | End: 2020-09-23

## 2020-09-21 NOTE — TELEPHONE ENCOUNTER
Patient needs xigduo 5 mg / 1000 xr      [facility]    [facility]    [facility]  Patient will be out of his meds next week

## 2020-09-23 RX ORDER — HYDROCHLOROTHIAZIDE 12.5 MG/1
12.5 CAPSULE, GELATIN COATED ORAL EVERY MORNING
Qty: 90 CAPSULE | Refills: 1 | Status: SHIPPED | OUTPATIENT
Start: 2020-09-23 | End: 2021-02-25

## 2020-09-23 RX ORDER — NIFEDIPINE 60 MG/1
60 TABLET, EXTENDED RELEASE ORAL DAILY
Qty: 90 TABLET | Refills: 0 | Status: SHIPPED | OUTPATIENT
Start: 2020-09-23 | End: 2020-12-08

## 2020-09-23 RX ORDER — LOSARTAN POTASSIUM 100 MG/1
100 TABLET ORAL DAILY
Qty: 90 TABLET | Refills: 1 | Status: SHIPPED | OUTPATIENT
Start: 2020-09-23 | End: 2021-02-25

## 2020-09-23 RX ORDER — ERGOCALCIFEROL 1.25 MG/1
50000 CAPSULE ORAL
Qty: 12 CAPSULE | Refills: 1 | Status: SHIPPED | OUTPATIENT
Start: 2020-09-23 | End: 2021-02-25

## 2020-10-13 DIAGNOSIS — R79.89 LOW TESTOSTERONE IN MALE: ICD-10-CM

## 2020-10-14 RX ORDER — SYRINGE WITH NEEDLE, 1 ML 25GX5/8"
SYRINGE, EMPTY DISPOSABLE MISCELLANEOUS
Qty: 15 EACH | Refills: 1 | Status: SHIPPED | OUTPATIENT
Start: 2020-10-14

## 2020-10-14 RX ORDER — TESTOSTERONE CYPIONATE 200 MG/ML
INJECTION, SOLUTION INTRAMUSCULAR
Qty: 2 ML | Refills: 0 | Status: SHIPPED | OUTPATIENT
Start: 2020-10-14 | End: 2020-11-08

## 2020-10-28 ENCOUNTER — RESULTS ENCOUNTER (OUTPATIENT)
Dept: ENDOCRINOLOGY | Age: 60
End: 2020-10-28

## 2020-10-28 DIAGNOSIS — E11.65 TYPE 2 DIABETES MELLITUS WITH HYPERGLYCEMIA, WITHOUT LONG-TERM CURRENT USE OF INSULIN (HCC): ICD-10-CM

## 2020-10-28 DIAGNOSIS — I73.9 PERIPHERAL BLOOD VESSEL DISORDER (HCC): ICD-10-CM

## 2020-10-28 DIAGNOSIS — I10 BENIGN HYPERTENSION: ICD-10-CM

## 2020-10-28 DIAGNOSIS — E78.5 DYSLIPIDEMIA: ICD-10-CM

## 2020-10-28 DIAGNOSIS — E55.9 VITAMIN D DEFICIENCY: ICD-10-CM

## 2020-10-28 DIAGNOSIS — E66.01 CLASS 3 SEVERE OBESITY WITHOUT SERIOUS COMORBIDITY WITH BODY MASS INDEX (BMI) OF 45.0 TO 49.9 IN ADULT, UNSPECIFIED OBESITY TYPE (HCC): ICD-10-CM

## 2020-11-07 DIAGNOSIS — R79.89 LOW TESTOSTERONE IN MALE: ICD-10-CM

## 2020-11-08 RX ORDER — TESTOSTERONE CYPIONATE 200 MG/ML
INJECTION, SOLUTION INTRAMUSCULAR
Qty: 2 ML | Refills: 0 | Status: SHIPPED | OUTPATIENT
Start: 2020-11-08

## 2020-11-16 DIAGNOSIS — E55.9 VITAMIN D DEFICIENCY: ICD-10-CM

## 2020-11-16 DIAGNOSIS — E11.65 TYPE 2 DIABETES MELLITUS WITH HYPERGLYCEMIA, WITHOUT LONG-TERM CURRENT USE OF INSULIN (HCC): ICD-10-CM

## 2020-11-16 DIAGNOSIS — I10 BENIGN HYPERTENSION: Primary | ICD-10-CM

## 2020-11-21 ENCOUNTER — RESULTS ENCOUNTER (OUTPATIENT)
Dept: ENDOCRINOLOGY | Age: 60
End: 2020-11-21

## 2020-11-21 DIAGNOSIS — I10 BENIGN HYPERTENSION: ICD-10-CM

## 2020-11-21 DIAGNOSIS — E55.9 VITAMIN D DEFICIENCY: ICD-10-CM

## 2020-11-21 DIAGNOSIS — E11.65 TYPE 2 DIABETES MELLITUS WITH HYPERGLYCEMIA, WITHOUT LONG-TERM CURRENT USE OF INSULIN (HCC): ICD-10-CM

## 2020-12-07 DIAGNOSIS — R79.89 LOW TESTOSTERONE IN MALE: ICD-10-CM

## 2020-12-07 DIAGNOSIS — I10 ESSENTIAL HYPERTENSION: ICD-10-CM

## 2020-12-07 DIAGNOSIS — M25.50 ARTHRALGIA, UNSPECIFIED JOINT: ICD-10-CM

## 2020-12-07 RX ORDER — TESTOSTERONE CYPIONATE 200 MG/ML
INJECTION, SOLUTION INTRAMUSCULAR
OUTPATIENT
Start: 2020-12-07

## 2020-12-08 RX ORDER — NIFEDIPINE 60 MG/1
60 TABLET, EXTENDED RELEASE ORAL DAILY
Qty: 90 TABLET | Refills: 0 | Status: SHIPPED | OUTPATIENT
Start: 2020-12-08 | End: 2021-02-25

## 2020-12-08 RX ORDER — MELOXICAM 15 MG/1
15 TABLET ORAL DAILY
Qty: 90 TABLET | Refills: 0 | Status: SHIPPED | OUTPATIENT
Start: 2020-12-08 | End: 2021-02-25

## 2020-12-08 RX ORDER — ATORVASTATIN CALCIUM 20 MG/1
20 TABLET, FILM COATED ORAL NIGHTLY
Qty: 90 TABLET | Refills: 0 | Status: SHIPPED | OUTPATIENT
Start: 2020-12-08 | End: 2021-02-25

## 2021-01-12 ENCOUNTER — HOSPITAL ENCOUNTER (OUTPATIENT)
Dept: CARDIOLOGY | Facility: HOSPITAL | Age: 61
Discharge: HOME | End: 2021-01-12
Admitting: PHYSICIAN ASSISTANT

## 2021-01-12 ENCOUNTER — OFFICE VISIT (OUTPATIENT)
Dept: FAMILY MEDICINE CLINIC | Facility: CLINIC | Age: 61
End: 2021-01-12

## 2021-01-12 VITALS
WEIGHT: 315 LBS | RESPIRATION RATE: 16 BRPM | DIASTOLIC BLOOD PRESSURE: 60 MMHG | HEIGHT: 71 IN | SYSTOLIC BLOOD PRESSURE: 150 MMHG | TEMPERATURE: 98.2 F | BODY MASS INDEX: 44.1 KG/M2 | HEART RATE: 90 BPM | OXYGEN SATURATION: 97 %

## 2021-01-12 DIAGNOSIS — M79.604 PAIN IN RIGHT LEG: Primary | ICD-10-CM

## 2021-01-12 LAB
BH CV LOWER VASCULAR LEFT COMMON FEMORAL AUGMENT: NORMAL
BH CV LOWER VASCULAR LEFT COMMON FEMORAL COMPETENT: NORMAL
BH CV LOWER VASCULAR LEFT COMMON FEMORAL COMPRESS: NORMAL
BH CV LOWER VASCULAR LEFT COMMON FEMORAL PHASIC: NORMAL
BH CV LOWER VASCULAR LEFT COMMON FEMORAL SPONT: NORMAL
BH CV LOWER VASCULAR RIGHT COMMON FEMORAL AUGMENT: NORMAL
BH CV LOWER VASCULAR RIGHT COMMON FEMORAL COMPETENT: NORMAL
BH CV LOWER VASCULAR RIGHT COMMON FEMORAL COMPRESS: NORMAL
BH CV LOWER VASCULAR RIGHT COMMON FEMORAL PHASIC: NORMAL
BH CV LOWER VASCULAR RIGHT COMMON FEMORAL SPONT: NORMAL
BH CV LOWER VASCULAR RIGHT DISTAL FEMORAL COMPRESS: NORMAL
BH CV LOWER VASCULAR RIGHT GASTRONEMIUS COMPRESS: NORMAL
BH CV LOWER VASCULAR RIGHT GREATER SAPH AK COMPRESS: NORMAL
BH CV LOWER VASCULAR RIGHT GREATER SAPH BK COMPRESS: NORMAL
BH CV LOWER VASCULAR RIGHT LESSER SAPH COMPRESS: NORMAL
BH CV LOWER VASCULAR RIGHT MID FEMORAL AUGMENT: NORMAL
BH CV LOWER VASCULAR RIGHT MID FEMORAL COMPETENT: NORMAL
BH CV LOWER VASCULAR RIGHT MID FEMORAL COMPRESS: NORMAL
BH CV LOWER VASCULAR RIGHT MID FEMORAL PHASIC: NORMAL
BH CV LOWER VASCULAR RIGHT MID FEMORAL SPONT: NORMAL
BH CV LOWER VASCULAR RIGHT PERONEAL COMPRESS: NORMAL
BH CV LOWER VASCULAR RIGHT POPLITEAL AUGMENT: NORMAL
BH CV LOWER VASCULAR RIGHT POPLITEAL COMPETENT: NORMAL
BH CV LOWER VASCULAR RIGHT POPLITEAL COMPRESS: NORMAL
BH CV LOWER VASCULAR RIGHT POPLITEAL PHASIC: NORMAL
BH CV LOWER VASCULAR RIGHT POPLITEAL SPONT: NORMAL
BH CV LOWER VASCULAR RIGHT POSTERIOR TIBIAL COMPRESS: NORMAL
BH CV LOWER VASCULAR RIGHT PROFUNDA FEMORAL COMPRESS: NORMAL
BH CV LOWER VASCULAR RIGHT PROXIMAL FEMORAL COMPRESS: NORMAL
BH CV LOWER VASCULAR RIGHT SAPHENOFEMORAL JUNCTION COMPRESS: NORMAL

## 2021-01-12 PROCEDURE — 99213 OFFICE O/P EST LOW 20 MIN: CPT | Performed by: PHYSICIAN ASSISTANT

## 2021-01-12 PROCEDURE — 93971 EXTREMITY STUDY: CPT

## 2021-01-12 NOTE — PROGRESS NOTES
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     History of Present Illness     Was ok 1/3/2020 but at 12 am 1/4/2021, had increased pain in RLE and had to get a cane to help him walk- posterior to right knee. No incresaed swelling. He has tenderness. If he is sitting, no pain. Pain radiates to calf and thigh.     The following portions of the patient's history were reviewed and updated as appropriate: allergies, current medications, past family history, past medical history, past social history, past surgical history and problem list.    Review of Systems   Constitutional: Negative.    HENT: Negative.    Respiratory: Negative.    Cardiovascular: Negative.    Musculoskeletal: Positive for arthralgias, gait problem and myalgias.   Neurological: Negative for dizziness and weakness.   Psychiatric/Behavioral: Negative.        Objective   Vitals:    01/12/21 1058   BP: 150/60   Pulse: 90   Resp: 16   Temp: 98.2 °F (36.8 °C)   SpO2: 97%     Body mass index is 47.03 kg/m².    Physical Exam  Vitals signs and nursing note reviewed.   Constitutional:       General: He is not in acute distress.     Appearance: Normal appearance. He is well-developed.   HENT:      Head: Normocephalic and atraumatic.      Right Ear: External ear normal.      Left Ear: External ear normal.   Eyes:      Conjunctiva/sclera: Conjunctivae normal.   Neck:      Thyroid: No thyroid mass or thyromegaly.      Vascular: No carotid bruit.      Trachea: No tracheal deviation.   Cardiovascular:      Rate and Rhythm: Normal rate and regular rhythm.      Heart sounds: Normal heart sounds.   Pulmonary:      Effort: Pulmonary effort is normal.      Breath sounds: Normal breath sounds.   Musculoskeletal:      Right lower leg: He exhibits tenderness and swelling. He exhibits no deformity and no laceration.   Skin:     General: Skin is warm and dry.   Neurological:      Mental Status: He is alert and oriented to person, place, and  time.      Gait: Gait normal.   Psychiatric:         Behavior: Behavior normal.         Thought Content: Thought content normal.         Judgment: Judgment normal.         Assessment/Plan   Diagnoses and all orders for this visit:    1. Pain in right leg (Primary)  -     Duplex Venous Lower Extremity - Right CAR      Assessment and Plan  Patient with RLE pain since 1/4/2021. He denies CP, SOA, palpitations, tachycardia, or other symptoms. He has localized pain posterior to right knee. I will refer for ASAP RLE duplex to rule out DVT. He has known OA/ DJD knee. If persistent symptoms and negative duplex, he needs additional imaging vs orthopedist for knee symptoms. Unfortunately, he changed insurance and is unable to have labs or routine follow up here. He is overdue for follow up, fasting labs, and DM follow up. He should have follow up here or at a new PCP that he can use with insurance to ensure he has proper follow up. To ER ASAP if worsening, new, or changing symptoms.

## 2021-01-12 NOTE — PROGRESS NOTES
RLE Venous doppler study complete. Preliminary negative for DVT, SVt and negative for fluid at pop fossa called to Lorena Conley cell phone and message left

## 2021-02-24 DIAGNOSIS — I10 ESSENTIAL HYPERTENSION: ICD-10-CM

## 2021-02-24 DIAGNOSIS — M25.50 ARTHRALGIA, UNSPECIFIED JOINT: ICD-10-CM

## 2021-02-24 DIAGNOSIS — E55.9 VITAMIN D DEFICIENCY: ICD-10-CM

## 2021-02-25 RX ORDER — NIFEDIPINE 60 MG/1
60 TABLET, EXTENDED RELEASE ORAL DAILY
Qty: 30 TABLET | Refills: 0 | Status: SHIPPED | OUTPATIENT
Start: 2021-02-25

## 2021-02-25 RX ORDER — DAPAGLIFLOZIN AND METFORMIN HYDROCHLORIDE 5; 1000 MG/1; MG/1
2 TABLET, FILM COATED, EXTENDED RELEASE ORAL DAILY
Qty: 180 TABLET | Refills: 0 | OUTPATIENT
Start: 2021-02-25

## 2021-02-25 RX ORDER — ATORVASTATIN CALCIUM 20 MG/1
20 TABLET, FILM COATED ORAL NIGHTLY
Qty: 30 TABLET | Refills: 0 | Status: SHIPPED | OUTPATIENT
Start: 2021-02-25 | End: 2021-04-01

## 2021-02-25 RX ORDER — HYDROCHLOROTHIAZIDE 12.5 MG/1
12.5 CAPSULE, GELATIN COATED ORAL EVERY MORNING
Qty: 30 CAPSULE | Refills: 0 | Status: SHIPPED | OUTPATIENT
Start: 2021-02-25 | End: 2021-04-01

## 2021-02-25 RX ORDER — MELOXICAM 15 MG/1
15 TABLET ORAL DAILY
Qty: 30 TABLET | Refills: 0 | Status: SHIPPED | OUTPATIENT
Start: 2021-02-25 | End: 2021-04-01

## 2021-02-25 RX ORDER — LOSARTAN POTASSIUM 100 MG/1
100 TABLET ORAL DAILY
Qty: 30 TABLET | Refills: 0 | Status: SHIPPED | OUTPATIENT
Start: 2021-02-25 | End: 2021-04-01

## 2021-02-25 RX ORDER — ERGOCALCIFEROL 1.25 MG/1
50000 CAPSULE ORAL
Qty: 5 CAPSULE | Refills: 0 | Status: SHIPPED | OUTPATIENT
Start: 2021-02-25 | End: 2021-04-01

## 2021-02-25 NOTE — TELEPHONE ENCOUNTER
I changed prescription to 30 day supply rather than 90 day supply since he is already very overdue for follow up. He also must schedule follow up with endocrinology and keep appt with them.

## 2021-03-31 DIAGNOSIS — M25.50 ARTHRALGIA, UNSPECIFIED JOINT: ICD-10-CM

## 2021-03-31 DIAGNOSIS — I10 ESSENTIAL HYPERTENSION: ICD-10-CM

## 2021-03-31 DIAGNOSIS — E55.9 VITAMIN D DEFICIENCY: ICD-10-CM

## 2021-04-01 RX ORDER — NIFEDIPINE 60 MG/1
TABLET, FILM COATED, EXTENDED RELEASE ORAL
Qty: 30 TABLET | Refills: 3 | Status: SHIPPED | OUTPATIENT
Start: 2021-04-01

## 2021-04-01 RX ORDER — ERGOCALCIFEROL 1.25 MG/1
50000 CAPSULE ORAL
Qty: 5 CAPSULE | Refills: 0 | Status: SHIPPED | OUTPATIENT
Start: 2021-04-01

## 2021-04-01 RX ORDER — LOSARTAN POTASSIUM 100 MG/1
100 TABLET ORAL DAILY
Qty: 30 TABLET | Refills: 0 | Status: SHIPPED | OUTPATIENT
Start: 2021-04-01

## 2021-04-01 RX ORDER — HYDROCHLOROTHIAZIDE 12.5 MG/1
12.5 CAPSULE, GELATIN COATED ORAL EVERY MORNING
Qty: 30 CAPSULE | Refills: 0 | Status: SHIPPED | OUTPATIENT
Start: 2021-04-01

## 2021-04-01 RX ORDER — MELOXICAM 15 MG/1
15 TABLET ORAL DAILY
Qty: 30 TABLET | Refills: 0 | Status: SHIPPED | OUTPATIENT
Start: 2021-04-01

## 2021-04-01 RX ORDER — ATORVASTATIN CALCIUM 20 MG/1
20 TABLET, FILM COATED ORAL NIGHTLY
Qty: 30 TABLET | Refills: 0 | Status: SHIPPED | OUTPATIENT
Start: 2021-04-01

## 2021-04-01 NOTE — TELEPHONE ENCOUNTER
January was an acute visit. He has not been evaluated here since 6/2020. There was a concern about us taking his insurance, and I advised that he needs to find PCP that accepts his insurance if this is a concern. I will give a 30 day supply. He will either have to be seen here or at another PCP within 30 days. If he is seen here, he will need to be fasting to complete labs that day.